# Patient Record
Sex: FEMALE | Race: WHITE | Employment: OTHER | URBAN - METROPOLITAN AREA
[De-identification: names, ages, dates, MRNs, and addresses within clinical notes are randomized per-mention and may not be internally consistent; named-entity substitution may affect disease eponyms.]

---

## 2017-01-07 ENCOUNTER — OFFICE VISIT (OUTPATIENT)
Dept: FAMILY MEDICINE CLINIC | Age: 82
End: 2017-01-07

## 2017-01-07 VITALS
SYSTOLIC BLOOD PRESSURE: 118 MMHG | HEART RATE: 66 BPM | TEMPERATURE: 98.5 F | RESPIRATION RATE: 16 BRPM | OXYGEN SATURATION: 97 % | DIASTOLIC BLOOD PRESSURE: 66 MMHG | HEIGHT: 64 IN

## 2017-01-07 DIAGNOSIS — I10 ESSENTIAL HYPERTENSION: ICD-10-CM

## 2017-01-07 DIAGNOSIS — Z23 NEED FOR PNEUMOCOCCAL VACCINATION: ICD-10-CM

## 2017-01-07 DIAGNOSIS — J44.1 COPD WITH EXACERBATION (HCC): Primary | ICD-10-CM

## 2017-01-07 DIAGNOSIS — E78.5 HYPERLIPIDEMIA, UNSPECIFIED HYPERLIPIDEMIA TYPE: ICD-10-CM

## 2017-01-07 PROCEDURE — 90732 PPSV23 VACC 2 YRS+ SUBQ/IM: CPT | Performed by: FAMILY MEDICINE

## 2017-01-07 PROCEDURE — 99213 OFFICE O/P EST LOW 20 MIN: CPT | Performed by: FAMILY MEDICINE

## 2017-01-07 PROCEDURE — G0009 ADMIN PNEUMOCOCCAL VACCINE: HCPCS | Performed by: FAMILY MEDICINE

## 2017-01-07 RX ORDER — PREDNISONE 20 MG/1
20 TABLET ORAL DAILY
Qty: 5 TABLET | Refills: 0 | Status: SHIPPED | OUTPATIENT
Start: 2017-01-07 | End: 2017-01-12

## 2017-01-07 RX ORDER — AZITHROMYCIN 250 MG/1
TABLET, FILM COATED ORAL
Qty: 1 PACKET | Refills: 0 | Status: SHIPPED | OUTPATIENT
Start: 2017-01-07 | End: 2017-01-17

## 2017-01-21 DIAGNOSIS — I10 ESSENTIAL HYPERTENSION: ICD-10-CM

## 2017-01-23 RX ORDER — VALSARTAN AND HYDROCHLOROTHIAZIDE 80; 12.5 MG/1; MG/1
TABLET, FILM COATED ORAL
Qty: 90 TABLET | Refills: 3 | Status: SHIPPED | OUTPATIENT
Start: 2017-01-23 | End: 2018-05-19 | Stop reason: SDUPTHER

## 2017-03-27 ENCOUNTER — CARE COORDINATION (OUTPATIENT)
Dept: FAMILY MEDICINE CLINIC | Age: 82
End: 2017-03-27

## 2017-03-28 ENCOUNTER — OFFICE VISIT (OUTPATIENT)
Dept: FAMILY MEDICINE CLINIC | Age: 82
End: 2017-03-28

## 2017-03-28 VITALS
DIASTOLIC BLOOD PRESSURE: 86 MMHG | RESPIRATION RATE: 16 BRPM | HEIGHT: 64 IN | OXYGEN SATURATION: 96 % | TEMPERATURE: 97.5 F | SYSTOLIC BLOOD PRESSURE: 110 MMHG | WEIGHT: 157 LBS | BODY MASS INDEX: 26.8 KG/M2 | HEART RATE: 84 BPM

## 2017-03-28 DIAGNOSIS — I10 ESSENTIAL HYPERTENSION: Primary | ICD-10-CM

## 2017-03-28 DIAGNOSIS — J44.1 COPD WITH EXACERBATION (HCC): ICD-10-CM

## 2017-03-28 DIAGNOSIS — N30.00 ACUTE CYSTITIS WITHOUT HEMATURIA: ICD-10-CM

## 2017-03-28 DIAGNOSIS — E78.5 HYPERLIPIDEMIA, UNSPECIFIED HYPERLIPIDEMIA TYPE: ICD-10-CM

## 2017-03-28 DIAGNOSIS — E07.9 THYROID MASS: ICD-10-CM

## 2017-03-28 DIAGNOSIS — M85.80 OSTEOPENIA: ICD-10-CM

## 2017-03-28 DIAGNOSIS — J45.41 MODERATE PERSISTENT ASTHMA WITH ACUTE EXACERBATION: ICD-10-CM

## 2017-03-28 PROCEDURE — 1036F TOBACCO NON-USER: CPT | Performed by: FAMILY MEDICINE

## 2017-03-28 PROCEDURE — 1123F ACP DISCUSS/DSCN MKR DOCD: CPT | Performed by: FAMILY MEDICINE

## 2017-03-28 PROCEDURE — G8484 FLU IMMUNIZE NO ADMIN: HCPCS | Performed by: FAMILY MEDICINE

## 2017-03-28 PROCEDURE — G8427 DOCREV CUR MEDS BY ELIG CLIN: HCPCS | Performed by: FAMILY MEDICINE

## 2017-03-28 PROCEDURE — 99214 OFFICE O/P EST MOD 30 MIN: CPT | Performed by: FAMILY MEDICINE

## 2017-03-28 PROCEDURE — G8399 PT W/DXA RESULTS DOCUMENT: HCPCS | Performed by: FAMILY MEDICINE

## 2017-03-28 PROCEDURE — 1090F PRES/ABSN URINE INCON ASSESS: CPT | Performed by: FAMILY MEDICINE

## 2017-03-28 PROCEDURE — G8926 SPIRO NO PERF OR DOC: HCPCS | Performed by: FAMILY MEDICINE

## 2017-03-28 PROCEDURE — 3023F SPIROM DOC REV: CPT | Performed by: FAMILY MEDICINE

## 2017-03-28 PROCEDURE — 4040F PNEUMOC VAC/ADMIN/RCVD: CPT | Performed by: FAMILY MEDICINE

## 2017-03-28 PROCEDURE — G8420 CALC BMI NORM PARAMETERS: HCPCS | Performed by: FAMILY MEDICINE

## 2017-03-28 RX ORDER — METHYLPREDNISOLONE 4 MG/1
TABLET ORAL
Refills: 0 | COMMUNITY
Start: 2017-03-24 | End: 2017-05-09 | Stop reason: ALTCHOICE

## 2017-03-28 RX ORDER — LEVOFLOXACIN 500 MG/1
TABLET, FILM COATED ORAL
Refills: 0 | COMMUNITY
Start: 2017-03-24 | End: 2017-05-09 | Stop reason: ALTCHOICE

## 2017-03-28 RX ORDER — LACTULOSE 10 G/15ML
10 SOLUTION ORAL EVERY EVENING
Qty: 500 ML | Refills: 3 | Status: SHIPPED | OUTPATIENT
Start: 2017-03-28

## 2017-04-10 ENCOUNTER — TELEPHONE (OUTPATIENT)
Dept: FAMILY MEDICINE CLINIC | Age: 82
End: 2017-04-10

## 2017-04-11 ENCOUNTER — HOSPITAL ENCOUNTER (OUTPATIENT)
Dept: ULTRASOUND IMAGING | Age: 82
Discharge: HOME | End: 2017-04-11
Payer: MEDICARE

## 2017-04-11 DIAGNOSIS — E07.9 THYROID MASS: ICD-10-CM

## 2017-04-11 PROCEDURE — 76536 US EXAM OF HEAD AND NECK: CPT

## 2017-04-13 DIAGNOSIS — J44.1 COPD WITH EXACERBATION (HCC): Primary | ICD-10-CM

## 2017-04-17 RX ORDER — FLUTICASONE FUROATE AND VILANTEROL 200; 25 UG/1; UG/1
1 POWDER RESPIRATORY (INHALATION) DAILY
Qty: 28 EACH | Refills: 5 | Status: SHIPPED | OUTPATIENT
Start: 2017-04-17 | End: 2017-04-18 | Stop reason: CLARIF

## 2017-04-24 DIAGNOSIS — J44.1 COPD WITH EXACERBATION (HCC): ICD-10-CM

## 2017-04-24 RX ORDER — MONTELUKAST SODIUM 10 MG/1
TABLET ORAL
Qty: 90 TABLET | Refills: 1 | Status: SHIPPED | OUTPATIENT
Start: 2017-04-24 | End: 2017-10-21 | Stop reason: SDUPTHER

## 2017-05-09 ENCOUNTER — OFFICE VISIT (OUTPATIENT)
Dept: FAMILY MEDICINE CLINIC | Age: 82
End: 2017-05-09

## 2017-05-09 VITALS
RESPIRATION RATE: 16 BRPM | OXYGEN SATURATION: 97 % | DIASTOLIC BLOOD PRESSURE: 70 MMHG | HEIGHT: 64 IN | HEART RATE: 74 BPM | TEMPERATURE: 98.1 F | SYSTOLIC BLOOD PRESSURE: 122 MMHG

## 2017-05-09 DIAGNOSIS — J44.1 COPD WITH EXACERBATION (HCC): ICD-10-CM

## 2017-05-09 DIAGNOSIS — I10 ESSENTIAL HYPERTENSION: Primary | ICD-10-CM

## 2017-05-09 DIAGNOSIS — E04.1 THYROID CYST: ICD-10-CM

## 2017-05-09 DIAGNOSIS — E78.5 HYPERLIPIDEMIA, UNSPECIFIED HYPERLIPIDEMIA TYPE: ICD-10-CM

## 2017-05-09 PROCEDURE — 1090F PRES/ABSN URINE INCON ASSESS: CPT | Performed by: FAMILY MEDICINE

## 2017-05-09 PROCEDURE — 3023F SPIROM DOC REV: CPT | Performed by: FAMILY MEDICINE

## 2017-05-09 PROCEDURE — G8399 PT W/DXA RESULTS DOCUMENT: HCPCS | Performed by: FAMILY MEDICINE

## 2017-05-09 PROCEDURE — G8427 DOCREV CUR MEDS BY ELIG CLIN: HCPCS | Performed by: FAMILY MEDICINE

## 2017-05-09 PROCEDURE — 1123F ACP DISCUSS/DSCN MKR DOCD: CPT | Performed by: FAMILY MEDICINE

## 2017-05-09 PROCEDURE — G8420 CALC BMI NORM PARAMETERS: HCPCS | Performed by: FAMILY MEDICINE

## 2017-05-09 PROCEDURE — 99213 OFFICE O/P EST LOW 20 MIN: CPT | Performed by: FAMILY MEDICINE

## 2017-05-09 PROCEDURE — G8926 SPIRO NO PERF OR DOC: HCPCS | Performed by: FAMILY MEDICINE

## 2017-05-09 PROCEDURE — 1036F TOBACCO NON-USER: CPT | Performed by: FAMILY MEDICINE

## 2017-05-09 PROCEDURE — 4040F PNEUMOC VAC/ADMIN/RCVD: CPT | Performed by: FAMILY MEDICINE

## 2017-05-30 ENCOUNTER — TELEPHONE (OUTPATIENT)
Dept: FAMILY MEDICINE CLINIC | Age: 82
End: 2017-05-30

## 2017-05-30 DIAGNOSIS — J44.1 COPD WITH EXACERBATION (HCC): Primary | ICD-10-CM

## 2017-05-30 RX ORDER — AZITHROMYCIN 250 MG/1
TABLET, FILM COATED ORAL
Qty: 1 PACKET | Refills: 0 | Status: SHIPPED | OUTPATIENT
Start: 2017-05-30 | End: 2017-06-09

## 2017-05-31 ENCOUNTER — CARE COORDINATION (OUTPATIENT)
Dept: CARE COORDINATION | Age: 82
End: 2017-05-31

## 2017-05-31 DIAGNOSIS — J45.901 ASTHMA EXACERBATION: ICD-10-CM

## 2017-05-31 RX ORDER — PREDNISONE 10 MG/1
TABLET ORAL
Qty: 28 TABLET | Refills: 0 | Status: SHIPPED | OUTPATIENT
Start: 2017-05-31 | End: 2017-07-12 | Stop reason: ALTCHOICE

## 2017-05-31 ASSESSMENT — ENCOUNTER SYMPTOMS: DYSPNEA ASSOCIATED WITH: EXERTION

## 2017-06-15 ENCOUNTER — CARE COORDINATION (OUTPATIENT)
Dept: CARE COORDINATION | Age: 82
End: 2017-06-15

## 2017-06-15 ASSESSMENT — ENCOUNTER SYMPTOMS: DYSPNEA ASSOCIATED WITH: EXERTION

## 2017-07-07 ENCOUNTER — CARE COORDINATION (OUTPATIENT)
Dept: CARE COORDINATION | Age: 82
End: 2017-07-07

## 2017-07-12 ENCOUNTER — OFFICE VISIT (OUTPATIENT)
Dept: FAMILY MEDICINE CLINIC | Age: 82
End: 2017-07-12

## 2017-07-12 ENCOUNTER — CARE COORDINATOR VISIT (OUTPATIENT)
Dept: CARE COORDINATION | Age: 82
End: 2017-07-12

## 2017-07-12 VITALS
DIASTOLIC BLOOD PRESSURE: 62 MMHG | HEART RATE: 76 BPM | RESPIRATION RATE: 16 BRPM | SYSTOLIC BLOOD PRESSURE: 124 MMHG | TEMPERATURE: 97.9 F | HEIGHT: 64 IN

## 2017-07-12 DIAGNOSIS — L65.9 ALOPECIA: ICD-10-CM

## 2017-07-12 DIAGNOSIS — J44.1 COPD WITH EXACERBATION (HCC): ICD-10-CM

## 2017-07-12 DIAGNOSIS — I10 ESSENTIAL HYPERTENSION: Primary | ICD-10-CM

## 2017-07-12 DIAGNOSIS — E78.5 HYPERLIPIDEMIA, UNSPECIFIED HYPERLIPIDEMIA TYPE: ICD-10-CM

## 2017-07-12 DIAGNOSIS — I10 ESSENTIAL HYPERTENSION: ICD-10-CM

## 2017-07-12 DIAGNOSIS — M53.3 COCCYDYNIA: ICD-10-CM

## 2017-07-12 LAB
ALBUMIN SERPL-MCNC: 4.4 G/DL (ref 3.9–4.9)
ALP BLD-CCNC: 81 U/L (ref 40–130)
ALT SERPL-CCNC: 14 U/L (ref 0–33)
ANION GAP SERPL CALCULATED.3IONS-SCNC: 11 MEQ/L (ref 7–13)
AST SERPL-CCNC: 16 U/L (ref 0–35)
BILIRUB SERPL-MCNC: 0.3 MG/DL (ref 0–1.2)
BUN BLDV-MCNC: 17 MG/DL (ref 8–23)
CALCIUM SERPL-MCNC: 9.6 MG/DL (ref 8.6–10.2)
CHLORIDE BLD-SCNC: 103 MEQ/L (ref 98–107)
CHOLESTEROL, TOTAL: 234 MG/DL (ref 0–199)
CO2: 29 MEQ/L (ref 22–29)
CREAT SERPL-MCNC: 0.6 MG/DL (ref 0.5–0.9)
GFR AFRICAN AMERICAN: >60
GFR NON-AFRICAN AMERICAN: >60
GLOBULIN: 2.2 G/DL (ref 2.3–3.5)
GLUCOSE BLD-MCNC: 93 MG/DL (ref 74–109)
HCT VFR BLD CALC: 39.8 % (ref 37–47)
HDLC SERPL-MCNC: 43 MG/DL (ref 40–59)
HEMOGLOBIN: 13.3 G/DL (ref 12–16)
LDL CHOLESTEROL CALCULATED: 149 MG/DL (ref 0–129)
MCH RBC QN AUTO: 29.7 PG (ref 27–31.3)
MCHC RBC AUTO-ENTMCNC: 33.5 % (ref 33–37)
MCV RBC AUTO: 88.9 FL (ref 82–100)
PDW BLD-RTO: 13.7 % (ref 11.5–14.5)
PLATELET # BLD: 187 K/UL (ref 130–400)
POTASSIUM SERPL-SCNC: 4.3 MEQ/L (ref 3.5–5.1)
RBC # BLD: 4.48 M/UL (ref 4.2–5.4)
SODIUM BLD-SCNC: 143 MEQ/L (ref 132–144)
T4 FREE: 0.75 NG/DL (ref 0.93–1.7)
TOTAL PROTEIN: 6.6 G/DL (ref 6.4–8.1)
TRIGL SERPL-MCNC: 209 MG/DL (ref 0–200)
TSH SERPL DL<=0.05 MIU/L-ACNC: 3.1 UIU/ML (ref 0.27–4.2)
WBC # BLD: 4.7 K/UL (ref 4.8–10.8)

## 2017-07-12 PROCEDURE — 99214 OFFICE O/P EST MOD 30 MIN: CPT | Performed by: FAMILY MEDICINE

## 2017-07-12 PROCEDURE — 1036F TOBACCO NON-USER: CPT | Performed by: FAMILY MEDICINE

## 2017-07-12 PROCEDURE — G8926 SPIRO NO PERF OR DOC: HCPCS | Performed by: FAMILY MEDICINE

## 2017-07-12 PROCEDURE — G8419 CALC BMI OUT NRM PARAM NOF/U: HCPCS | Performed by: FAMILY MEDICINE

## 2017-07-12 PROCEDURE — 1090F PRES/ABSN URINE INCON ASSESS: CPT | Performed by: FAMILY MEDICINE

## 2017-07-12 PROCEDURE — 3023F SPIROM DOC REV: CPT | Performed by: FAMILY MEDICINE

## 2017-07-12 PROCEDURE — 1123F ACP DISCUSS/DSCN MKR DOCD: CPT | Performed by: FAMILY MEDICINE

## 2017-07-12 PROCEDURE — G8427 DOCREV CUR MEDS BY ELIG CLIN: HCPCS | Performed by: FAMILY MEDICINE

## 2017-07-12 PROCEDURE — G8399 PT W/DXA RESULTS DOCUMENT: HCPCS | Performed by: FAMILY MEDICINE

## 2017-07-12 PROCEDURE — 4040F PNEUMOC VAC/ADMIN/RCVD: CPT | Performed by: FAMILY MEDICINE

## 2017-07-12 RX ORDER — LEVALBUTEROL TARTRATE 45 UG/1
1 AEROSOL, METERED ORAL EVERY 4 HOURS PRN
Qty: 3 INHALER | Refills: 3 | Status: SHIPPED | OUTPATIENT
Start: 2017-07-12 | End: 2017-10-11 | Stop reason: ALTCHOICE

## 2017-07-13 RX ORDER — LEVOTHYROXINE SODIUM 0.03 MG/1
25 TABLET ORAL DAILY
Qty: 30 TABLET | Refills: 3 | Status: SHIPPED | OUTPATIENT
Start: 2017-07-13 | End: 2017-10-23 | Stop reason: DRUGHIGH

## 2017-07-27 ENCOUNTER — CARE COORDINATION (OUTPATIENT)
Dept: CARE COORDINATION | Age: 82
End: 2017-07-27

## 2017-08-21 ENCOUNTER — CARE COORDINATION (OUTPATIENT)
Dept: CARE COORDINATION | Age: 82
End: 2017-08-21

## 2017-10-11 ENCOUNTER — OFFICE VISIT (OUTPATIENT)
Dept: FAMILY MEDICINE CLINIC | Age: 82
End: 2017-10-11

## 2017-10-11 VITALS
DIASTOLIC BLOOD PRESSURE: 72 MMHG | HEIGHT: 64 IN | HEART RATE: 60 BPM | SYSTOLIC BLOOD PRESSURE: 116 MMHG | RESPIRATION RATE: 16 BRPM | TEMPERATURE: 98.1 F | OXYGEN SATURATION: 97 %

## 2017-10-11 DIAGNOSIS — I10 ESSENTIAL HYPERTENSION: Primary | ICD-10-CM

## 2017-10-11 DIAGNOSIS — I10 ESSENTIAL HYPERTENSION: ICD-10-CM

## 2017-10-11 DIAGNOSIS — J44.1 COPD WITH EXACERBATION (HCC): ICD-10-CM

## 2017-10-11 DIAGNOSIS — E78.5 HYPERLIPIDEMIA, UNSPECIFIED HYPERLIPIDEMIA TYPE: ICD-10-CM

## 2017-10-11 DIAGNOSIS — J45.41 MODERATE PERSISTENT ASTHMA WITH ACUTE EXACERBATION: ICD-10-CM

## 2017-10-11 LAB
ALBUMIN SERPL-MCNC: 4.3 G/DL (ref 3.9–4.9)
ALP BLD-CCNC: 74 U/L (ref 40–130)
ALT SERPL-CCNC: 15 U/L (ref 0–33)
ANION GAP SERPL CALCULATED.3IONS-SCNC: 16 MEQ/L (ref 7–13)
AST SERPL-CCNC: 17 U/L (ref 0–35)
BILIRUB SERPL-MCNC: 0.3 MG/DL (ref 0–1.2)
BUN BLDV-MCNC: 13 MG/DL (ref 8–23)
CALCIUM SERPL-MCNC: 9.6 MG/DL (ref 8.6–10.2)
CHLORIDE BLD-SCNC: 105 MEQ/L (ref 98–107)
CHOLESTEROL, TOTAL: 235 MG/DL (ref 0–199)
CO2: 27 MEQ/L (ref 22–29)
CREAT SERPL-MCNC: 0.72 MG/DL (ref 0.5–0.9)
GFR AFRICAN AMERICAN: >60
GFR NON-AFRICAN AMERICAN: >60
GLOBULIN: 2.2 G/DL (ref 2.3–3.5)
GLUCOSE BLD-MCNC: 104 MG/DL (ref 74–109)
HDLC SERPL-MCNC: 42 MG/DL (ref 40–59)
LDL CHOLESTEROL CALCULATED: 153 MG/DL (ref 0–129)
POTASSIUM SERPL-SCNC: 4.5 MEQ/L (ref 3.5–5.1)
SODIUM BLD-SCNC: 148 MEQ/L (ref 132–144)
T4 FREE: 0.78 NG/DL (ref 0.93–1.7)
TOTAL PROTEIN: 6.5 G/DL (ref 6.4–8.1)
TRIGL SERPL-MCNC: 199 MG/DL (ref 0–200)
TSH SERPL DL<=0.05 MIU/L-ACNC: 2.13 UIU/ML (ref 0.27–4.2)

## 2017-10-11 PROCEDURE — G8421 BMI NOT CALCULATED: HCPCS | Performed by: FAMILY MEDICINE

## 2017-10-11 PROCEDURE — 99214 OFFICE O/P EST MOD 30 MIN: CPT | Performed by: FAMILY MEDICINE

## 2017-10-11 PROCEDURE — G8484 FLU IMMUNIZE NO ADMIN: HCPCS | Performed by: FAMILY MEDICINE

## 2017-10-11 PROCEDURE — G8926 SPIRO NO PERF OR DOC: HCPCS | Performed by: FAMILY MEDICINE

## 2017-10-11 PROCEDURE — 1123F ACP DISCUSS/DSCN MKR DOCD: CPT | Performed by: FAMILY MEDICINE

## 2017-10-11 PROCEDURE — 4040F PNEUMOC VAC/ADMIN/RCVD: CPT | Performed by: FAMILY MEDICINE

## 2017-10-11 PROCEDURE — 1090F PRES/ABSN URINE INCON ASSESS: CPT | Performed by: FAMILY MEDICINE

## 2017-10-11 PROCEDURE — G8427 DOCREV CUR MEDS BY ELIG CLIN: HCPCS | Performed by: FAMILY MEDICINE

## 2017-10-11 PROCEDURE — G8399 PT W/DXA RESULTS DOCUMENT: HCPCS | Performed by: FAMILY MEDICINE

## 2017-10-11 PROCEDURE — 3023F SPIROM DOC REV: CPT | Performed by: FAMILY MEDICINE

## 2017-10-11 PROCEDURE — 1036F TOBACCO NON-USER: CPT | Performed by: FAMILY MEDICINE

## 2017-10-11 RX ORDER — PREDNISONE 20 MG/1
20 TABLET ORAL DAILY
Qty: 5 TABLET | Refills: 0 | Status: SHIPPED | OUTPATIENT
Start: 2017-10-11 | End: 2017-11-17 | Stop reason: SDUPTHER

## 2017-10-11 RX ORDER — AZITHROMYCIN 250 MG/1
TABLET, FILM COATED ORAL
Qty: 1 PACKET | Refills: 0 | Status: SHIPPED | OUTPATIENT
Start: 2017-10-11 | End: 2017-11-17 | Stop reason: SDUPTHER

## 2017-10-11 NOTE — PROGRESS NOTES
Subjective  Laila LINDA Lowe, 81 y.o. female presents today with:  Chief Complaint   Patient presents with    Hypertension    Hyperlipidemia    COPD    Discuss Medications     wants to discuss d/c'ing asthma meds     reqs prn zpak and prednisone-aware of r/b/a  Also declines mammogram and colon ca screening        Past Medical History:   Diagnosis Date    Asthma     COPD (chronic obstructive pulmonary disease) ([address]     Cystocele     Fibrocystic disease of breast     History of TIA (transient ischemic attack)     Hyperlipidemia     Hypertension     Normal cardiac stress test 2011    Osteopenia     Ventral hernia      Past Surgical History:   Procedure Laterality Date    BREAST SURGERY  1980'S    BIOPSIES X2     COLONOSCOPY  2008? [id number] [id number]    GASTRIC FUNDOPLICATION  34/3718    OUT 10/09/2008    HERNIA REPAIR  2433    UMBILICAL    DEBBIE AND BSO  1976    NO CANCER    UPPER GASTROINTESTINAL ENDOSCOPY  2008    hiatal hernia     Social History     Social History    Marital status:      Spouse name: N/A    Number of children: N/A    Years of education: N/A     Occupational History    Not on file.      Social History Main Topics    Smoking status: Never Smoker    Smokeless tobacco: Never Used    Alcohol use No    Drug use: No    Sexual activity: Not on file     Other Topics Concern    Not on file     Social History Narrative    No narrative on file     Family History   Problem Relation Age of Onset    Diabetes Mother     Heart Disease Mother     High Blood Pressure Mother     Colon Cancer Mother     Cancer Brother      BLADDER     Allergies   Allergen Reactions    Niacin And Related     Percocet [Oxycodone-Acetaminophen]     Tetanus Toxoids      Current Outpatient Prescriptions   Medication Sig Dispense Refill    BIOTIN PO Take by mouth      levothyroxine (SYNTHROID) 25 MCG tablet Take 1 tablet by mouth Daily 30 tablet 3    fluticasone-salmeterol (ADVAIR DISKUS) 250-50 MCG/DOSE AEPB Inhale 1 puff into the lungs every 12 hours 3 Inhaler 1    montelukast (SINGULAIR) 10 MG tablet TAKE 1 TABLET DAILY 90 tablet 1    lactulose (CHRONULAC) 10 GM/15ML solution Take 15 mLs by mouth every evening 500 mL 3    valsartan-hydrochlorothiazide (DIOVAN-HCT) 80-12.5 MG per tablet TAKE 1 TABLET DAILY 90 tablet 3    ASMANEX 60 METERED DOSES 220 MCG/INH inhaler USE 2 INHALATIONS DAILY (DISCARD 45 DAYS AFTER  OPENING) 3 Inhaler 1    ipratropium (ATROVENT) 0.02 % nebulizer solution Take 2.5 mLs by nebulization 4 times daily as needed for Wheezing 360 mL 2    Ascorbic Acid (VITAMIN C) 500 MG tablet Take 500 mg by mouth daily      Omega-3 Fatty Acids (FISH OIL PO) Take  by mouth 2 times daily.  loratadine (CLARITIN) 10 MG tablet Take 10 mg by mouth daily.  Multiple Vitamin (MULTIVITAMIN PO) Take 1 tablet by mouth daily. No current facility-administered medications for this visit. The patient denies any history of      seizures,             heart attack or KNOWN CAD        or stroke. No chest pain, CHRONIC MILD shortness of breath, NO paroxysmal nocturnal dyspnea. No nausea, vomiting, diarrhea, hematochezia or melena. No paresthesias or headaches. No dysuria, frequency or hematuria. Last labs  Orders Only on 07/12/2017   Component Date Value Ref Range Status    Cholesterol, Total 07/12/2017 234* 0 - 199 mg/dL Final    Triglycerides 07/12/2017 209* 0 - 200 mg/dL Final    HDL 07/12/2017 43  40 - 59 mg/dL Final    Comment: ATP III HDL Cholesterol Classification is Desirable. Expected Values:    Males:    >55 = No Risk            35-55 = Moderate Risk            <35 = High Risk    Females:  >65 = No Risk            45-65 = Moderate Risk            <45 = High Risk    NCEP Guidelines:   Third Report May 2001  >59 = negative risk factor for CHD  <40 = major risk factor for CHD      LDL Calculated 07/12/2017 149* 0 - 129 mg/dL Final    WBC 07/12/2017 4.7* inhaler Duplicate Order    levalbuterol (XOPENEX HFA) 45 MCG/ACT inhaler Therapy completed    albuterol (PROVENTIL) (2.5 MG/3ML) 0.083% nebulizer solution Therapy completed     No Follow-up on file. Wants to dc claritin and singulair til spring  re cholesterol -bc  on diet  Angelluciano Jaya is advised to follow up ASAP if condition deteriorates or problems arise and if no information on test results to patient in the next 1 month they are advised to call us.      Celia Serrano MD

## 2017-10-21 DIAGNOSIS — J44.1 COPD WITH EXACERBATION (HCC): ICD-10-CM

## 2017-10-23 ENCOUNTER — TELEPHONE (OUTPATIENT)
Dept: FAMILY MEDICINE CLINIC | Age: 82
End: 2017-10-23

## 2017-10-23 DIAGNOSIS — E03.9 ACQUIRED HYPOTHYROIDISM: Primary | ICD-10-CM

## 2017-10-23 RX ORDER — MONTELUKAST SODIUM 10 MG/1
TABLET ORAL
Qty: 90 TABLET | Refills: 1 | Status: SHIPPED | OUTPATIENT
Start: 2017-10-23 | End: 2018-05-19 | Stop reason: SDUPTHER

## 2017-10-23 RX ORDER — LEVOTHYROXINE SODIUM 0.05 MG/1
50 TABLET ORAL DAILY
Qty: 90 TABLET | Refills: 1 | Status: SHIPPED | OUTPATIENT
Start: 2017-10-23 | End: 2018-05-19 | Stop reason: SDUPTHER

## 2017-10-24 RX ORDER — LEVOTHYROXINE SODIUM 0.03 MG/1
25 TABLET ORAL DAILY
Qty: 30 TABLET | Refills: 5 | Status: SHIPPED | OUTPATIENT
Start: 2017-10-24 | End: 2018-04-11 | Stop reason: ALTCHOICE

## 2017-11-17 RX ORDER — AZITHROMYCIN 250 MG/1
TABLET, FILM COATED ORAL
Qty: 6 TABLET | Refills: 0 | Status: SHIPPED | OUTPATIENT
Start: 2017-11-17 | End: 2018-04-11 | Stop reason: ALTCHOICE

## 2017-11-17 RX ORDER — PREDNISONE 20 MG/1
20 TABLET ORAL DAILY
Qty: 5 TABLET | Refills: 0 | Status: SHIPPED | OUTPATIENT
Start: 2017-11-17 | End: 2018-04-11 | Stop reason: SDUPTHER

## 2017-11-17 NOTE — TELEPHONE ENCOUNTER
Patient stopped into office stating she only has enough Advair for today and will need a RX sent to [facility] as well as one sent to [facility] in [place]. Please contact patient when sent to pharmacies. Patient also states she usually receives a reserve Z-pack and prednisone RX. Please advise on this/send to [facility]. Thank you.

## 2018-04-11 ENCOUNTER — OFFICE VISIT (OUTPATIENT)
Dept: FAMILY MEDICINE CLINIC | Age: 83
End: 2018-04-11
Payer: MEDICARE

## 2018-04-11 VITALS
HEIGHT: 64 IN | HEART RATE: 76 BPM | TEMPERATURE: 97.9 F | RESPIRATION RATE: 16 BRPM | SYSTOLIC BLOOD PRESSURE: 130 MMHG | DIASTOLIC BLOOD PRESSURE: 68 MMHG

## 2018-04-11 DIAGNOSIS — E03.9 ACQUIRED HYPOTHYROIDISM: ICD-10-CM

## 2018-04-11 DIAGNOSIS — I10 ESSENTIAL HYPERTENSION: Primary | ICD-10-CM

## 2018-04-11 DIAGNOSIS — E78.5 HYPERLIPIDEMIA, UNSPECIFIED HYPERLIPIDEMIA TYPE: ICD-10-CM

## 2018-04-11 DIAGNOSIS — J44.1 COPD WITH EXACERBATION (HCC): ICD-10-CM

## 2018-04-11 DIAGNOSIS — I10 ESSENTIAL HYPERTENSION: ICD-10-CM

## 2018-04-11 LAB
ALBUMIN SERPL-MCNC: 4.2 G/DL (ref 3.9–4.9)
ALP BLD-CCNC: 70 U/L (ref 40–130)
ALT SERPL-CCNC: 14 U/L (ref 0–33)
ANION GAP SERPL CALCULATED.3IONS-SCNC: 12 MEQ/L (ref 7–13)
AST SERPL-CCNC: 16 U/L (ref 0–35)
BILIRUB SERPL-MCNC: 0.4 MG/DL (ref 0–1.2)
BUN BLDV-MCNC: 16 MG/DL (ref 8–23)
CALCIUM SERPL-MCNC: 9.2 MG/DL (ref 8.6–10.2)
CHLORIDE BLD-SCNC: 103 MEQ/L (ref 98–107)
CO2: 29 MEQ/L (ref 22–29)
CREAT SERPL-MCNC: 0.59 MG/DL (ref 0.5–0.9)
GFR AFRICAN AMERICAN: >60
GFR NON-AFRICAN AMERICAN: >60
GLOBULIN: 2 G/DL (ref 2.3–3.5)
GLUCOSE BLD-MCNC: 89 MG/DL (ref 74–109)
HCT VFR BLD CALC: 37.9 % (ref 37–47)
HEMOGLOBIN: 12.7 G/DL (ref 12–16)
MCH RBC QN AUTO: 30.5 PG (ref 27–31.3)
MCHC RBC AUTO-ENTMCNC: 33.4 % (ref 33–37)
MCV RBC AUTO: 91.2 FL (ref 82–100)
PDW BLD-RTO: 14 % (ref 11.5–14.5)
PLATELET # BLD: 188 K/UL (ref 130–400)
POTASSIUM SERPL-SCNC: 4.2 MEQ/L (ref 3.5–5.1)
RBC # BLD: 4.16 M/UL (ref 4.2–5.4)
SODIUM BLD-SCNC: 144 MEQ/L (ref 132–144)
T4 FREE: 1.17 NG/DL (ref 0.93–1.7)
TOTAL PROTEIN: 6.2 G/DL (ref 6.4–8.1)
TSH SERPL DL<=0.05 MIU/L-ACNC: 1.43 UIU/ML (ref 0.27–4.2)
WBC # BLD: 4.4 K/UL (ref 4.8–10.8)

## 2018-04-11 PROCEDURE — G8427 DOCREV CUR MEDS BY ELIG CLIN: HCPCS | Performed by: FAMILY MEDICINE

## 2018-04-11 PROCEDURE — 99214 OFFICE O/P EST MOD 30 MIN: CPT | Performed by: FAMILY MEDICINE

## 2018-04-11 PROCEDURE — 1123F ACP DISCUSS/DSCN MKR DOCD: CPT | Performed by: FAMILY MEDICINE

## 2018-04-11 PROCEDURE — 4040F PNEUMOC VAC/ADMIN/RCVD: CPT | Performed by: FAMILY MEDICINE

## 2018-04-11 PROCEDURE — G8926 SPIRO NO PERF OR DOC: HCPCS | Performed by: FAMILY MEDICINE

## 2018-04-11 PROCEDURE — 3023F SPIROM DOC REV: CPT | Performed by: FAMILY MEDICINE

## 2018-04-11 PROCEDURE — G8421 BMI NOT CALCULATED: HCPCS | Performed by: FAMILY MEDICINE

## 2018-04-11 PROCEDURE — G8399 PT W/DXA RESULTS DOCUMENT: HCPCS | Performed by: FAMILY MEDICINE

## 2018-04-11 PROCEDURE — 1090F PRES/ABSN URINE INCON ASSESS: CPT | Performed by: FAMILY MEDICINE

## 2018-04-11 PROCEDURE — 1036F TOBACCO NON-USER: CPT | Performed by: FAMILY MEDICINE

## 2018-04-11 RX ORDER — LEVALBUTEROL TARTRATE 45 MCG
HFA AEROSOL WITH ADAPTER (GRAM) INHALATION
COMMUNITY
Start: 2018-03-22

## 2018-04-11 RX ORDER — PREDNISONE 20 MG/1
20 TABLET ORAL DAILY
Qty: 5 TABLET | Refills: 0 | Status: SHIPPED | OUTPATIENT
Start: 2018-04-11 | End: 2018-04-16

## 2018-04-11 RX ORDER — AZITHROMYCIN 250 MG/1
TABLET, FILM COATED ORAL
Qty: 6 TABLET | Refills: 0 | Status: SHIPPED | OUTPATIENT
Start: 2018-04-11 | End: 2018-10-12 | Stop reason: ALTCHOICE

## 2018-04-11 ASSESSMENT — PATIENT HEALTH QUESTIONNAIRE - PHQ9
2. FEELING DOWN, DEPRESSED OR HOPELESS: 0
SUM OF ALL RESPONSES TO PHQ QUESTIONS 1-9: 0
1. LITTLE INTEREST OR PLEASURE IN DOING THINGS: 0
SUM OF ALL RESPONSES TO PHQ9 QUESTIONS 1 & 2: 0

## 2018-05-19 DIAGNOSIS — J44.1 COPD WITH EXACERBATION (HCC): ICD-10-CM

## 2018-05-19 DIAGNOSIS — I10 ESSENTIAL HYPERTENSION: ICD-10-CM

## 2018-05-19 DIAGNOSIS — E03.9 ACQUIRED HYPOTHYROIDISM: ICD-10-CM

## 2018-05-21 RX ORDER — LEVOTHYROXINE SODIUM 0.05 MG/1
TABLET ORAL
Qty: 90 TABLET | Refills: 1 | Status: SHIPPED | OUTPATIENT
Start: 2018-05-21 | End: 2018-10-25 | Stop reason: SDUPTHER

## 2018-05-21 RX ORDER — VALSARTAN AND HYDROCHLOROTHIAZIDE 80; 12.5 MG/1; MG/1
TABLET, FILM COATED ORAL
Qty: 90 TABLET | Refills: 1 | Status: SHIPPED | OUTPATIENT
Start: 2018-05-21 | End: 2018-10-25 | Stop reason: SDUPTHER

## 2018-05-21 RX ORDER — MONTELUKAST SODIUM 10 MG/1
TABLET ORAL
Qty: 90 TABLET | Refills: 1 | Status: SHIPPED | OUTPATIENT
Start: 2018-05-21

## 2018-06-07 ENCOUNTER — OFFICE VISIT (OUTPATIENT)
Dept: FAMILY MEDICINE CLINIC | Age: 83
End: 2018-06-07
Payer: MEDICARE

## 2018-06-07 ENCOUNTER — TELEPHONE (OUTPATIENT)
Dept: FAMILY MEDICINE CLINIC | Age: 83
End: 2018-06-07

## 2018-06-07 VITALS
HEART RATE: 56 BPM | HEIGHT: 64 IN | WEIGHT: 162 LBS | RESPIRATION RATE: 16 BRPM | TEMPERATURE: 98.3 F | SYSTOLIC BLOOD PRESSURE: 128 MMHG | DIASTOLIC BLOOD PRESSURE: 76 MMHG | OXYGEN SATURATION: 97 % | BODY MASS INDEX: 27.66 KG/M2

## 2018-06-07 DIAGNOSIS — T14.8XXA BITE: ICD-10-CM

## 2018-06-07 DIAGNOSIS — E03.9 HYPOTHYROIDISM, UNSPECIFIED TYPE: ICD-10-CM

## 2018-06-07 DIAGNOSIS — J44.9 CHRONIC OBSTRUCTIVE PULMONARY DISEASE, UNSPECIFIED COPD TYPE (HCC): Primary | ICD-10-CM

## 2018-06-07 PROCEDURE — G8427 DOCREV CUR MEDS BY ELIG CLIN: HCPCS | Performed by: INTERNAL MEDICINE

## 2018-06-07 PROCEDURE — G8926 SPIRO NO PERF OR DOC: HCPCS | Performed by: INTERNAL MEDICINE

## 2018-06-07 PROCEDURE — 1123F ACP DISCUSS/DSCN MKR DOCD: CPT | Performed by: INTERNAL MEDICINE

## 2018-06-07 PROCEDURE — 3288F FALL RISK ASSESSMENT DOCD: CPT | Performed by: INTERNAL MEDICINE

## 2018-06-07 PROCEDURE — G8399 PT W/DXA RESULTS DOCUMENT: HCPCS | Performed by: INTERNAL MEDICINE

## 2018-06-07 PROCEDURE — 1036F TOBACCO NON-USER: CPT | Performed by: INTERNAL MEDICINE

## 2018-06-07 PROCEDURE — G8510 SCR DEP NEG, NO PLAN REQD: HCPCS | Performed by: INTERNAL MEDICINE

## 2018-06-07 PROCEDURE — G8419 CALC BMI OUT NRM PARAM NOF/U: HCPCS | Performed by: INTERNAL MEDICINE

## 2018-06-07 PROCEDURE — 99214 OFFICE O/P EST MOD 30 MIN: CPT | Performed by: INTERNAL MEDICINE

## 2018-06-07 PROCEDURE — 1090F PRES/ABSN URINE INCON ASSESS: CPT | Performed by: INTERNAL MEDICINE

## 2018-06-07 PROCEDURE — 3023F SPIROM DOC REV: CPT | Performed by: INTERNAL MEDICINE

## 2018-06-07 PROCEDURE — 4040F PNEUMOC VAC/ADMIN/RCVD: CPT | Performed by: INTERNAL MEDICINE

## 2018-06-07 RX ORDER — AZITHROMYCIN 250 MG/1
TABLET, FILM COATED ORAL
Qty: 1 PACKET | Refills: 0 | Status: SHIPPED | OUTPATIENT
Start: 2018-06-07 | End: 2018-10-12 | Stop reason: ALTCHOICE

## 2018-06-07 RX ORDER — PREDNISONE 20 MG/1
20 TABLET ORAL DAILY
Qty: 5 TABLET | Refills: 0 | Status: SHIPPED | OUTPATIENT
Start: 2018-06-07 | End: 2018-06-26 | Stop reason: SDUPTHER

## 2018-06-07 ASSESSMENT — ENCOUNTER SYMPTOMS
ABDOMINAL PAIN: 0
SHORTNESS OF BREATH: 0
BACK PAIN: 0
SINUS PRESSURE: 1
EYE PAIN: 0

## 2018-06-22 ENCOUNTER — OFFICE VISIT (OUTPATIENT)
Dept: FAMILY MEDICINE CLINIC | Age: 83
End: 2018-06-22
Payer: MEDICARE

## 2018-06-22 VITALS
TEMPERATURE: 98.4 F | OXYGEN SATURATION: 95 % | HEART RATE: 80 BPM | SYSTOLIC BLOOD PRESSURE: 138 MMHG | RESPIRATION RATE: 18 BRPM | DIASTOLIC BLOOD PRESSURE: 80 MMHG

## 2018-06-22 DIAGNOSIS — J44.9 CHRONIC OBSTRUCTIVE PULMONARY DISEASE, UNSPECIFIED COPD TYPE (HCC): ICD-10-CM

## 2018-06-22 DIAGNOSIS — J40 BRONCHITIS: Primary | ICD-10-CM

## 2018-06-22 DIAGNOSIS — H61.23 BILATERAL IMPACTED CERUMEN: ICD-10-CM

## 2018-06-22 DIAGNOSIS — I10 ESSENTIAL HYPERTENSION: ICD-10-CM

## 2018-06-22 PROCEDURE — G8427 DOCREV CUR MEDS BY ELIG CLIN: HCPCS | Performed by: INTERNAL MEDICINE

## 2018-06-22 PROCEDURE — 1123F ACP DISCUSS/DSCN MKR DOCD: CPT | Performed by: INTERNAL MEDICINE

## 2018-06-22 PROCEDURE — 3023F SPIROM DOC REV: CPT | Performed by: INTERNAL MEDICINE

## 2018-06-22 PROCEDURE — G8926 SPIRO NO PERF OR DOC: HCPCS | Performed by: INTERNAL MEDICINE

## 2018-06-22 PROCEDURE — 1090F PRES/ABSN URINE INCON ASSESS: CPT | Performed by: INTERNAL MEDICINE

## 2018-06-22 PROCEDURE — 93000 ELECTROCARDIOGRAM COMPLETE: CPT | Performed by: INTERNAL MEDICINE

## 2018-06-22 PROCEDURE — 69209 REMOVE IMPACTED EAR WAX UNI: CPT | Performed by: INTERNAL MEDICINE

## 2018-06-22 PROCEDURE — 99214 OFFICE O/P EST MOD 30 MIN: CPT | Performed by: INTERNAL MEDICINE

## 2018-06-22 PROCEDURE — 4040F PNEUMOC VAC/ADMIN/RCVD: CPT | Performed by: INTERNAL MEDICINE

## 2018-06-22 PROCEDURE — G8419 CALC BMI OUT NRM PARAM NOF/U: HCPCS | Performed by: INTERNAL MEDICINE

## 2018-06-22 PROCEDURE — G8399 PT W/DXA RESULTS DOCUMENT: HCPCS | Performed by: INTERNAL MEDICINE

## 2018-06-22 PROCEDURE — 1036F TOBACCO NON-USER: CPT | Performed by: INTERNAL MEDICINE

## 2018-06-22 RX ORDER — BENZONATATE 100 MG/1
100 CAPSULE ORAL 3 TIMES DAILY PRN
Qty: 21 CAPSULE | Refills: 0 | Status: SHIPPED | OUTPATIENT
Start: 2018-06-22 | End: 2018-06-29

## 2018-06-22 ASSESSMENT — ENCOUNTER SYMPTOMS
SHORTNESS OF BREATH: 0
COUGH: 1
BACK PAIN: 0
EYE PAIN: 0
ABDOMINAL PAIN: 0

## 2018-06-26 ENCOUNTER — TELEPHONE (OUTPATIENT)
Dept: FAMILY MEDICINE CLINIC | Age: 83
End: 2018-06-26

## 2018-06-26 DIAGNOSIS — J44.9 CHRONIC OBSTRUCTIVE PULMONARY DISEASE, UNSPECIFIED COPD TYPE (HCC): ICD-10-CM

## 2018-06-26 RX ORDER — PREDNISONE 20 MG/1
TABLET ORAL
Qty: 7 TABLET | Refills: 0 | Status: SHIPPED | OUTPATIENT
Start: 2018-06-26 | End: 2018-10-12 | Stop reason: ALTCHOICE

## 2018-10-12 ENCOUNTER — OFFICE VISIT (OUTPATIENT)
Dept: FAMILY MEDICINE CLINIC | Age: 83
End: 2018-10-12
Payer: MEDICARE

## 2018-10-12 VITALS
WEIGHT: 170 LBS | HEART RATE: 80 BPM | TEMPERATURE: 98.3 F | HEIGHT: 64 IN | DIASTOLIC BLOOD PRESSURE: 74 MMHG | RESPIRATION RATE: 16 BRPM | BODY MASS INDEX: 29.02 KG/M2 | SYSTOLIC BLOOD PRESSURE: 126 MMHG

## 2018-10-12 DIAGNOSIS — E03.9 ACQUIRED HYPOTHYROIDISM: ICD-10-CM

## 2018-10-12 DIAGNOSIS — J44.9 CHRONIC OBSTRUCTIVE PULMONARY DISEASE, UNSPECIFIED COPD TYPE (HCC): ICD-10-CM

## 2018-10-12 DIAGNOSIS — E78.5 HYPERLIPIDEMIA, UNSPECIFIED HYPERLIPIDEMIA TYPE: ICD-10-CM

## 2018-10-12 DIAGNOSIS — Z12.31 VISIT FOR SCREENING MAMMOGRAM: ICD-10-CM

## 2018-10-12 DIAGNOSIS — Z23 NEED FOR INFLUENZA VACCINATION: ICD-10-CM

## 2018-10-12 DIAGNOSIS — I10 ESSENTIAL HYPERTENSION: Primary | ICD-10-CM

## 2018-10-12 DIAGNOSIS — I10 ESSENTIAL HYPERTENSION: ICD-10-CM

## 2018-10-12 LAB
ALBUMIN SERPL-MCNC: 4.2 G/DL (ref 3.9–4.9)
ALP BLD-CCNC: 73 U/L (ref 40–130)
ALT SERPL-CCNC: 16 U/L (ref 0–33)
ANION GAP SERPL CALCULATED.3IONS-SCNC: 15 MEQ/L (ref 7–13)
AST SERPL-CCNC: 20 U/L (ref 0–35)
BILIRUB SERPL-MCNC: 0.4 MG/DL (ref 0–1.2)
BUN BLDV-MCNC: 18 MG/DL (ref 8–23)
CALCIUM SERPL-MCNC: 9.8 MG/DL (ref 8.6–10.2)
CHLORIDE BLD-SCNC: 99 MEQ/L (ref 98–107)
CHOLESTEROL, TOTAL: 242 MG/DL (ref 0–199)
CO2: 28 MEQ/L (ref 22–29)
CREAT SERPL-MCNC: 0.67 MG/DL (ref 0.5–0.9)
GFR AFRICAN AMERICAN: >60
GFR NON-AFRICAN AMERICAN: >60
GLOBULIN: 2.5 G/DL (ref 2.3–3.5)
GLUCOSE BLD-MCNC: 90 MG/DL (ref 74–109)
HCT VFR BLD CALC: 40.9 % (ref 37–47)
HDLC SERPL-MCNC: 45 MG/DL (ref 40–59)
HEMOGLOBIN: 13.7 G/DL (ref 12–16)
LDL CHOLESTEROL CALCULATED: 161 MG/DL (ref 0–129)
MCH RBC QN AUTO: 30.5 PG (ref 27–31.3)
MCHC RBC AUTO-ENTMCNC: 33.5 % (ref 33–37)
MCV RBC AUTO: 91 FL (ref 82–100)
PDW BLD-RTO: 13.9 % (ref 11.5–14.5)
PLATELET # BLD: 178 K/UL (ref 130–400)
POTASSIUM SERPL-SCNC: 4.3 MEQ/L (ref 3.5–5.1)
RBC # BLD: 4.49 M/UL (ref 4.2–5.4)
SODIUM BLD-SCNC: 142 MEQ/L (ref 132–144)
T4 FREE: 1.11 NG/DL (ref 0.93–1.7)
TOTAL PROTEIN: 6.7 G/DL (ref 6.4–8.1)
TRIGL SERPL-MCNC: 179 MG/DL (ref 0–200)
TSH SERPL DL<=0.05 MIU/L-ACNC: 1.28 UIU/ML (ref 0.27–4.2)
WBC # BLD: 4.6 K/UL (ref 4.8–10.8)

## 2018-10-12 PROCEDURE — G8427 DOCREV CUR MEDS BY ELIG CLIN: HCPCS | Performed by: FAMILY MEDICINE

## 2018-10-12 PROCEDURE — 3023F SPIROM DOC REV: CPT | Performed by: FAMILY MEDICINE

## 2018-10-12 PROCEDURE — 1090F PRES/ABSN URINE INCON ASSESS: CPT | Performed by: FAMILY MEDICINE

## 2018-10-12 PROCEDURE — 1036F TOBACCO NON-USER: CPT | Performed by: FAMILY MEDICINE

## 2018-10-12 PROCEDURE — 90662 IIV NO PRSV INCREASED AG IM: CPT | Performed by: FAMILY MEDICINE

## 2018-10-12 PROCEDURE — G8399 PT W/DXA RESULTS DOCUMENT: HCPCS | Performed by: FAMILY MEDICINE

## 2018-10-12 PROCEDURE — G8419 CALC BMI OUT NRM PARAM NOF/U: HCPCS | Performed by: FAMILY MEDICINE

## 2018-10-12 PROCEDURE — 1123F ACP DISCUSS/DSCN MKR DOCD: CPT | Performed by: FAMILY MEDICINE

## 2018-10-12 PROCEDURE — G8926 SPIRO NO PERF OR DOC: HCPCS | Performed by: FAMILY MEDICINE

## 2018-10-12 PROCEDURE — G8484 FLU IMMUNIZE NO ADMIN: HCPCS | Performed by: FAMILY MEDICINE

## 2018-10-12 PROCEDURE — 1101F PT FALLS ASSESS-DOCD LE1/YR: CPT | Performed by: FAMILY MEDICINE

## 2018-10-12 PROCEDURE — 99214 OFFICE O/P EST MOD 30 MIN: CPT | Performed by: FAMILY MEDICINE

## 2018-10-12 PROCEDURE — G0008 ADMIN INFLUENZA VIRUS VAC: HCPCS | Performed by: FAMILY MEDICINE

## 2018-10-12 PROCEDURE — 4040F PNEUMOC VAC/ADMIN/RCVD: CPT | Performed by: FAMILY MEDICINE

## 2018-10-12 RX ORDER — AZITHROMYCIN 250 MG/1
TABLET, FILM COATED ORAL
Qty: 1 PACKET | Refills: 0 | Status: SHIPPED | OUTPATIENT
Start: 2018-10-12 | End: 2018-10-16

## 2018-10-12 NOTE — PROGRESS NOTES
Therapy completed    azithromycin (ZITHROMAX) 250 MG tablet Therapy completed    predniSONE (DELTASONE) 20 MG tablet Therapy completed     No Follow-up on file. Breathing well  Exercise addressed  declines colon ca screening  [place] is advised to follow up ASAP if condition deteriorates or problems arise and if no information on test results to patient in the next 1 month they are advised to call us.    Declines mammogram  Walks qd    Maria T Avery MD

## 2018-10-18 ENCOUNTER — HOSPITAL ENCOUNTER (OUTPATIENT)
Dept: WOMENS IMAGING | Age: 83
Discharge: HOME | End: 2018-10-20
Payer: MEDICARE

## 2018-10-18 DIAGNOSIS — Z12.31 VISIT FOR SCREENING MAMMOGRAM: ICD-10-CM

## 2018-10-18 PROCEDURE — 77067 SCR MAMMO BI INCL CAD: CPT

## 2018-10-25 ENCOUNTER — OFFICE VISIT (OUTPATIENT)
Dept: FAMILY MEDICINE CLINIC | Age: 83
End: 2018-10-25
Payer: MEDICARE

## 2018-10-25 VITALS
TEMPERATURE: 98.3 F | WEIGHT: 171.1 LBS | SYSTOLIC BLOOD PRESSURE: 118 MMHG | RESPIRATION RATE: 14 BRPM | DIASTOLIC BLOOD PRESSURE: 80 MMHG | OXYGEN SATURATION: 96 % | BODY MASS INDEX: 29.21 KG/M2 | HEART RATE: 89 BPM | HEIGHT: 64 IN

## 2018-10-25 DIAGNOSIS — I10 ESSENTIAL HYPERTENSION: ICD-10-CM

## 2018-10-25 DIAGNOSIS — E03.9 ACQUIRED HYPOTHYROIDISM: ICD-10-CM

## 2018-10-25 DIAGNOSIS — H60.333 ACUTE SWIMMER'S EAR OF BOTH SIDES: Primary | ICD-10-CM

## 2018-10-25 PROCEDURE — 1036F TOBACCO NON-USER: CPT | Performed by: INTERNAL MEDICINE

## 2018-10-25 PROCEDURE — 4130F TOPICAL PREP RX AOE: CPT | Performed by: INTERNAL MEDICINE

## 2018-10-25 PROCEDURE — 1090F PRES/ABSN URINE INCON ASSESS: CPT | Performed by: INTERNAL MEDICINE

## 2018-10-25 PROCEDURE — 1123F ACP DISCUSS/DSCN MKR DOCD: CPT | Performed by: INTERNAL MEDICINE

## 2018-10-25 PROCEDURE — 1101F PT FALLS ASSESS-DOCD LE1/YR: CPT | Performed by: INTERNAL MEDICINE

## 2018-10-25 PROCEDURE — G8427 DOCREV CUR MEDS BY ELIG CLIN: HCPCS | Performed by: INTERNAL MEDICINE

## 2018-10-25 PROCEDURE — G8482 FLU IMMUNIZE ORDER/ADMIN: HCPCS | Performed by: INTERNAL MEDICINE

## 2018-10-25 PROCEDURE — G8419 CALC BMI OUT NRM PARAM NOF/U: HCPCS | Performed by: INTERNAL MEDICINE

## 2018-10-25 PROCEDURE — G8399 PT W/DXA RESULTS DOCUMENT: HCPCS | Performed by: INTERNAL MEDICINE

## 2018-10-25 PROCEDURE — 4040F PNEUMOC VAC/ADMIN/RCVD: CPT | Performed by: INTERNAL MEDICINE

## 2018-10-25 PROCEDURE — 99213 OFFICE O/P EST LOW 20 MIN: CPT | Performed by: INTERNAL MEDICINE

## 2018-10-25 RX ORDER — CIPROFLOXACIN AND DEXAMETHASONE 3; 1 MG/ML; MG/ML
4 SUSPENSION/ DROPS AURICULAR (OTIC) 2 TIMES DAILY
Qty: 1 BOTTLE | Refills: 0 | Status: SHIPPED | OUTPATIENT
Start: 2018-10-25 | End: 2019-03-15 | Stop reason: ALTCHOICE

## 2018-10-25 ASSESSMENT — ENCOUNTER SYMPTOMS
EYE PAIN: 0
ABDOMINAL PAIN: 0
BACK PAIN: 0
SHORTNESS OF BREATH: 0

## 2018-10-25 NOTE — PROGRESS NOTES
person, place, and time. She appears well-developed and well-nourished. HENT:   Head: Normocephalic. Minimal amount of erythema in right and left middle ear canal.   No pain to palpitation of bl external ears or mastoid processes. Eyes: Pupils are equal, round, and reactive to light. Neck: No tracheal deviation present. Cardiovascular: Normal rate, regular rhythm and normal heart sounds. Exam reveals no gallop and no friction rub. No murmur heard. Pulmonary/Chest: No respiratory distress. Abdominal: Soft. Bowel sounds are normal. She exhibits no distension. There is no rebound. Musculoskeletal: She exhibits no edema. Neurological: She is oriented to person, place, and time. Skin: Skin is warm and dry. Assessment:       Diagnosis Orders   1. Acute swimmer's ear of both sides  ciprofloxacin-dexamethasone (CIPRODEX) 0.3-0.1 % otic suspension         Plan:   Return Monday if not significantly improved. Cipro drops  She denies headaches, neck pain, swelling, difficulty swelling, etc.   No orders of the defined types were placed in this encounter. Orders Placed This Encounter   Medications    ciprofloxacin-dexamethasone (CIPRODEX) 0.3-0.1 % otic suspension     Sig: Place 4 drops into both ears 2 times daily     Dispense:  1 Bottle     Refill:  0       No Follow-up on file.       Yaya Okeefe MD

## 2018-10-26 RX ORDER — VALSARTAN AND HYDROCHLOROTHIAZIDE 80; 12.5 MG/1; MG/1
TABLET, FILM COATED ORAL
Qty: 90 TABLET | Refills: 3 | Status: SHIPPED | OUTPATIENT
Start: 2018-10-26

## 2018-10-26 RX ORDER — LEVOTHYROXINE SODIUM 0.05 MG/1
TABLET ORAL
Qty: 90 TABLET | Refills: 3 | Status: SHIPPED | OUTPATIENT
Start: 2018-10-26

## 2018-11-03 ENCOUNTER — OFFICE VISIT (OUTPATIENT)
Dept: FAMILY MEDICINE CLINIC | Age: 83
End: 2018-11-03
Payer: MEDICARE

## 2018-11-03 VITALS
BODY MASS INDEX: 29.37 KG/M2 | HEART RATE: 72 BPM | RESPIRATION RATE: 16 BRPM | TEMPERATURE: 97.1 F | DIASTOLIC BLOOD PRESSURE: 76 MMHG | HEIGHT: 64 IN | SYSTOLIC BLOOD PRESSURE: 118 MMHG

## 2018-11-03 DIAGNOSIS — J44.9 CHRONIC OBSTRUCTIVE PULMONARY DISEASE, UNSPECIFIED COPD TYPE (HCC): ICD-10-CM

## 2018-11-03 DIAGNOSIS — I10 ESSENTIAL HYPERTENSION: Primary | ICD-10-CM

## 2018-11-03 DIAGNOSIS — E78.5 HYPERLIPIDEMIA, UNSPECIFIED HYPERLIPIDEMIA TYPE: ICD-10-CM

## 2018-11-03 DIAGNOSIS — H65.91 RIGHT NON-SUPPURATIVE OTITIS MEDIA: ICD-10-CM

## 2018-11-03 DIAGNOSIS — E03.9 ACQUIRED HYPOTHYROIDISM: ICD-10-CM

## 2018-11-03 PROCEDURE — G8399 PT W/DXA RESULTS DOCUMENT: HCPCS | Performed by: FAMILY MEDICINE

## 2018-11-03 PROCEDURE — 3023F SPIROM DOC REV: CPT | Performed by: FAMILY MEDICINE

## 2018-11-03 PROCEDURE — G8482 FLU IMMUNIZE ORDER/ADMIN: HCPCS | Performed by: FAMILY MEDICINE

## 2018-11-03 PROCEDURE — G8419 CALC BMI OUT NRM PARAM NOF/U: HCPCS | Performed by: FAMILY MEDICINE

## 2018-11-03 PROCEDURE — 1090F PRES/ABSN URINE INCON ASSESS: CPT | Performed by: FAMILY MEDICINE

## 2018-11-03 PROCEDURE — 1123F ACP DISCUSS/DSCN MKR DOCD: CPT | Performed by: FAMILY MEDICINE

## 2018-11-03 PROCEDURE — G8427 DOCREV CUR MEDS BY ELIG CLIN: HCPCS | Performed by: FAMILY MEDICINE

## 2018-11-03 PROCEDURE — 99213 OFFICE O/P EST LOW 20 MIN: CPT | Performed by: FAMILY MEDICINE

## 2018-11-03 PROCEDURE — G8926 SPIRO NO PERF OR DOC: HCPCS | Performed by: FAMILY MEDICINE

## 2018-11-03 PROCEDURE — 4040F PNEUMOC VAC/ADMIN/RCVD: CPT | Performed by: FAMILY MEDICINE

## 2018-11-03 PROCEDURE — 1101F PT FALLS ASSESS-DOCD LE1/YR: CPT | Performed by: FAMILY MEDICINE

## 2018-11-03 PROCEDURE — 1036F TOBACCO NON-USER: CPT | Performed by: FAMILY MEDICINE

## 2018-11-03 RX ORDER — AZITHROMYCIN 250 MG/1
TABLET, FILM COATED ORAL
Qty: 1 PACKET | Refills: 0 | Status: SHIPPED | OUTPATIENT
Start: 2018-11-03 | End: 2018-11-07

## 2018-11-03 RX ORDER — PREDNISONE 20 MG/1
20 TABLET ORAL DAILY
Qty: 5 TABLET | Refills: 0 | Status: SHIPPED | OUTPATIENT
Start: 2018-11-03 | End: 2019-01-02 | Stop reason: SDUPTHER

## 2018-11-03 NOTE — PROGRESS NOTES
ears 2 times daily 1 Bottle 0    ADVAIR DISKUS 250-50 MCG/DOSE AEPB USE 1 INHALATION EVERY 12 HOURS 180 each 1    montelukast (SINGULAIR) 10 MG tablet TAKE 1 TABLET DAILY 90 tablet 1    linaclotide (LINZESS) 145 MCG capsule Take 1 capsule by mouth every morning (before breakfast) 30 capsule 5    XOPENEX HFA 45 MCG/ACT inhaler       BIOTIN PO Take by mouth      lactulose (CHRONULAC) 10 GM/15ML solution Take 15 mLs by mouth every evening 500 mL 3    ASMANEX 60 METERED DOSES 220 MCG/INH inhaler USE 2 INHALATIONS DAILY (DISCARD 45 DAYS AFTER  OPENING) 3 Inhaler 1    ipratropium (ATROVENT) 0.02 % nebulizer solution Take 2.5 mLs by nebulization 4 times daily as needed for Wheezing 360 mL 2    Ascorbic Acid (VITAMIN C) 500 MG tablet Take 500 mg by mouth daily      Omega-3 Fatty Acids (FISH OIL PO) Take  by mouth 2 times daily.  loratadine (CLARITIN) 10 MG tablet Take 10 mg by mouth daily.  Multiple Vitamin (MULTIVITAMIN PO) Take 1 tablet by mouth daily. No current facility-administered medications for this visit. The patient denies any history of      seizures,             heart attack or KNOWN CAD        or stroke. No chest pain, shortness of breath, paroxysmal nocturnal dyspnea. No nausea, vomiting, diarrhea, hematochezia or melena. No paresthesias or headaches. No dysuria, frequency or hematuria. Last labs  Orders Only on 10/12/2018   Component Date Value Ref Range Status    Cholesterol, Total 10/12/2018 242* 0 - 199 mg/dL Final    ATP III Cholesterol Classification is High.  Triglycerides 10/12/2018 179  0 - 200 mg/dL Final    ATP III Triglycerides Classification is Borderline High.  HDL 10/12/2018 45  40 - 59 mg/dL Final    Comment: ATP III HDL Cholesterol Classification is Desirable.   Expected Values:    Males:    >55 = No Risk            35-55 = Moderate Risk            <35 = High Risk    Females:  >65 = No Risk            45-65 = Moderate Risk

## 2018-11-25 ENCOUNTER — CARE COORDINATION (OUTPATIENT)
Dept: CARE COORDINATION | Age: 83
End: 2018-11-25

## 2018-12-31 ENCOUNTER — OFFICE VISIT (OUTPATIENT)
Dept: FAMILY MEDICINE CLINIC | Age: 83
End: 2018-12-31
Payer: MEDICARE

## 2018-12-31 VITALS
OXYGEN SATURATION: 98 % | HEIGHT: 64 IN | RESPIRATION RATE: 16 BRPM | BODY MASS INDEX: 29.37 KG/M2 | DIASTOLIC BLOOD PRESSURE: 68 MMHG | TEMPERATURE: 97.4 F | SYSTOLIC BLOOD PRESSURE: 128 MMHG | HEART RATE: 58 BPM

## 2018-12-31 DIAGNOSIS — J44.9 CHRONIC OBSTRUCTIVE PULMONARY DISEASE, UNSPECIFIED COPD TYPE (HCC): ICD-10-CM

## 2018-12-31 DIAGNOSIS — I49.9 CARDIAC ARRHYTHMIA, UNSPECIFIED CARDIAC ARRHYTHMIA TYPE: ICD-10-CM

## 2018-12-31 DIAGNOSIS — E03.9 ACQUIRED HYPOTHYROIDISM: ICD-10-CM

## 2018-12-31 DIAGNOSIS — J06.9 VIRAL URI: ICD-10-CM

## 2018-12-31 DIAGNOSIS — H65.113 ACUTE ALLERGIC MUCOID OTITIS MEDIA, BILATERAL: ICD-10-CM

## 2018-12-31 DIAGNOSIS — E78.5 HYPERLIPIDEMIA, UNSPECIFIED HYPERLIPIDEMIA TYPE: ICD-10-CM

## 2018-12-31 DIAGNOSIS — I10 ESSENTIAL HYPERTENSION: Primary | ICD-10-CM

## 2018-12-31 PROCEDURE — 3023F SPIROM DOC REV: CPT | Performed by: FAMILY MEDICINE

## 2018-12-31 PROCEDURE — 1090F PRES/ABSN URINE INCON ASSESS: CPT | Performed by: FAMILY MEDICINE

## 2018-12-31 PROCEDURE — G8482 FLU IMMUNIZE ORDER/ADMIN: HCPCS | Performed by: FAMILY MEDICINE

## 2018-12-31 PROCEDURE — 4040F PNEUMOC VAC/ADMIN/RCVD: CPT | Performed by: FAMILY MEDICINE

## 2018-12-31 PROCEDURE — G8399 PT W/DXA RESULTS DOCUMENT: HCPCS | Performed by: FAMILY MEDICINE

## 2018-12-31 PROCEDURE — 1036F TOBACCO NON-USER: CPT | Performed by: FAMILY MEDICINE

## 2018-12-31 PROCEDURE — G8926 SPIRO NO PERF OR DOC: HCPCS | Performed by: FAMILY MEDICINE

## 2018-12-31 PROCEDURE — 1123F ACP DISCUSS/DSCN MKR DOCD: CPT | Performed by: FAMILY MEDICINE

## 2018-12-31 PROCEDURE — 93000 ELECTROCARDIOGRAM COMPLETE: CPT | Performed by: FAMILY MEDICINE

## 2018-12-31 PROCEDURE — 1101F PT FALLS ASSESS-DOCD LE1/YR: CPT | Performed by: FAMILY MEDICINE

## 2018-12-31 PROCEDURE — 99214 OFFICE O/P EST MOD 30 MIN: CPT | Performed by: FAMILY MEDICINE

## 2018-12-31 PROCEDURE — G8427 DOCREV CUR MEDS BY ELIG CLIN: HCPCS | Performed by: FAMILY MEDICINE

## 2018-12-31 PROCEDURE — G8419 CALC BMI OUT NRM PARAM NOF/U: HCPCS | Performed by: FAMILY MEDICINE

## 2018-12-31 RX ORDER — AZITHROMYCIN 250 MG/1
250 TABLET, FILM COATED ORAL SEE ADMIN INSTRUCTIONS
Qty: 6 TABLET | Refills: 0 | Status: SHIPPED | OUTPATIENT
Start: 2018-12-31 | End: 2019-01-05

## 2018-12-31 NOTE — PROGRESS NOTES
Desirable. Expected Values:    Males:    >55 = No Risk            35-55 = Moderate Risk            <35 = High Risk    Females:  >65 = No Risk            45-65 = Moderate Risk            <45 = High Risk    NCEP Guidelines: Third Report May 2001  >59 = negative risk factor for CHD  <40 = major risk factor for CHD      LDL Calculated 10/12/2018 161* 0 - 129 mg/dL Final    ATP III LDL Classification is High.  WBC 10/12/2018 4.6* 4.8 - 10.8 K/uL Final    RBC 10/12/2018 4.49  4.20 - 5.40 M/uL Final    Hemoglobin 10/12/2018 13.7  12.0 - 16.0 g/dL Final    Hematocrit 10/12/2018 40.9  37.0 - 47.0 % Final    MCV 10/12/2018 91.0  82.0 - 100.0 fL Final    MCH 10/12/2018 30.5  27.0 - 31.3 pg Final    MCHC 10/12/2018 33.5  33.0 - 37.0 % Final    RDW 10/12/2018 13.9  11.5 - 14.5 % Final    Platelets 44/85/8094 178  130 - 400 K/uL Final    Sodium 10/12/2018 142  132 - 144 mEq/L Final    Potassium 10/12/2018 4.3  3.5 - 5.1 mEq/L Final    Chloride 10/12/2018 99  98 - 107 mEq/L Final    CO2 10/12/2018 28  22 - 29 mEq/L Final    Anion Gap 10/12/2018 15* 7 - 13 mEq/L Final    Glucose 10/12/2018 90  74 - 109 mg/dL Final    BUN 10/12/2018 18  8 - 23 mg/dL Final    CREATININE 10/12/2018 0.67  0.50 - 0.90 mg/dL Final    GFR Non- 10/12/2018 >60.0  >60 Final    Comment: >60 mL/min/1.73m2 EGFR, calc. for ages 25 and older using the  MDRD formula (not corrected for weight), is valid for stable  renal function.  GFR  10/12/2018 >60.0  >60 Final    Comment: >60 mL/min/1.73m2 EGFR, calc. for ages 25 and older using the  MDRD formula (not corrected for weight), is valid for stable  renal function.       Calcium 10/12/2018 9.8  8.6 - 10.2 mg/dL Final    Total Protein 10/12/2018 6.7  6.4 - 8.1 g/dL Final    Alb 10/12/2018 4.2  3.9 - 4.9 g/dL Final    Total Bilirubin 10/12/2018 0.4  0.0 - 1.2 mg/dL Final    Alkaline Phosphatase 10/12/2018 73  40 - 130 U/L Final    ALT 10/12/2018 16  0 - 33 U/L Final    AST 10/12/2018 20  0 - 35 U/L Final    Globulin 10/12/2018 2.5  2.3 - 3.5 g/dL Final    TSH 10/12/2018 1.280  0.270 - 4.200 uIU/mL Final    T4 Free 10/12/2018 1.11  0.93 - 1.70 ng/dL Final     Health Maintenance   Topic Date Due    Shingles Vaccine (1 of 2 - 2 Dose Series) 09/24/1986    TSH testing  10/12/2019    Potassium monitoring  10/12/2019    Creatinine monitoring  10/12/2019    DEXA (modify frequency per FRAX score)  Addressed    Flu vaccine  Completed    Pneumococcal low/med risk  Completed       No results found for this visit on 12/31/18. Objective      Wt Readings from Last 3 Encounters:   10/25/18 171 lb 1.6 oz (77.6 kg)   10/12/18 170 lb (77.1 kg)   06/07/18 162 lb (73.5 kg)     Temp Readings from Last 3 Encounters:   12/31/18 97.4 °F (36.3 °C) (Tympanic)   11/03/18 97.1 °F (36.2 °C) (Oral)   10/25/18 98.3 °F (36.8 °C) (Tympanic)     BP Readings from Last 3 Encounters:   12/31/18 128/68   11/03/18 118/76   10/25/18 118/80     Pulse Readings from Last 3 Encounters:   12/31/18 58   11/03/18 72   10/25/18 89       PHYSICAL EXAMINATION:        GENERAL:    The patient appears well nourished and well-developed,     Normal affect. Not appearing significantly anxious or depressed. No acute respiratory distress. Alert and oriented times 3. Skin:     No skin rashes. No concerning moles observed. Gait:    Normal gait. No ataxia. HEENT:  Normocephalic, atraumatic. Throat:  Pharynx is clear, no erythema/ edema or exudates   Ears:    TMs  A bit dull -MILDLY red   Eyes:  Extraocular eye motions intact and pain free. Pupils reactive/equal    Sclerae and conjunctivae clear    NECK: No masses or adenopathy palpable. No carotid bruits heard. No asymmetry visible. No thyromegaly. RESPIRATORY:   Clear/ Equal breath sounds /No acute respiratory distress. No wheezes,rales, or rhonchi.   No percussive abnormalities    HEART: Regular rhythm

## 2019-01-02 DIAGNOSIS — J44.9 CHRONIC OBSTRUCTIVE PULMONARY DISEASE, UNSPECIFIED COPD TYPE (HCC): Primary | ICD-10-CM

## 2019-01-02 RX ORDER — PREDNISONE 20 MG/1
20 TABLET ORAL DAILY
Qty: 5 TABLET | Refills: 0 | Status: SHIPPED | OUTPATIENT
Start: 2019-01-02 | End: 2019-03-15 | Stop reason: SDUPTHER

## 2019-02-12 ENCOUNTER — TELEPHONE (OUTPATIENT)
Dept: FAMILY MEDICINE CLINIC | Age: 84
End: 2019-02-12

## 2019-03-15 ENCOUNTER — OFFICE VISIT (OUTPATIENT)
Dept: FAMILY MEDICINE CLINIC | Age: 84
End: 2019-03-15
Payer: MEDICARE

## 2019-03-15 ENCOUNTER — TELEPHONE (OUTPATIENT)
Dept: ADMINISTRATIVE | Age: 84
End: 2019-03-15

## 2019-03-15 VITALS
WEIGHT: 168 LBS | TEMPERATURE: 98.3 F | DIASTOLIC BLOOD PRESSURE: 62 MMHG | BODY MASS INDEX: 28.68 KG/M2 | RESPIRATION RATE: 18 BRPM | OXYGEN SATURATION: 98 % | SYSTOLIC BLOOD PRESSURE: 110 MMHG | HEART RATE: 76 BPM | HEIGHT: 64 IN

## 2019-03-15 DIAGNOSIS — E03.9 ACQUIRED HYPOTHYROIDISM: ICD-10-CM

## 2019-03-15 DIAGNOSIS — I10 ESSENTIAL HYPERTENSION: Primary | ICD-10-CM

## 2019-03-15 DIAGNOSIS — E78.5 HYPERLIPIDEMIA, UNSPECIFIED HYPERLIPIDEMIA TYPE: ICD-10-CM

## 2019-03-15 DIAGNOSIS — J44.9 CHRONIC OBSTRUCTIVE PULMONARY DISEASE, UNSPECIFIED COPD TYPE (HCC): ICD-10-CM

## 2019-03-15 PROCEDURE — 99214 OFFICE O/P EST MOD 30 MIN: CPT | Performed by: FAMILY MEDICINE

## 2019-03-15 PROCEDURE — G8399 PT W/DXA RESULTS DOCUMENT: HCPCS | Performed by: FAMILY MEDICINE

## 2019-03-15 PROCEDURE — 1123F ACP DISCUSS/DSCN MKR DOCD: CPT | Performed by: FAMILY MEDICINE

## 2019-03-15 PROCEDURE — 1036F TOBACCO NON-USER: CPT | Performed by: FAMILY MEDICINE

## 2019-03-15 PROCEDURE — 4040F PNEUMOC VAC/ADMIN/RCVD: CPT | Performed by: FAMILY MEDICINE

## 2019-03-15 PROCEDURE — G8427 DOCREV CUR MEDS BY ELIG CLIN: HCPCS | Performed by: FAMILY MEDICINE

## 2019-03-15 PROCEDURE — 3023F SPIROM DOC REV: CPT | Performed by: FAMILY MEDICINE

## 2019-03-15 PROCEDURE — G8926 SPIRO NO PERF OR DOC: HCPCS | Performed by: FAMILY MEDICINE

## 2019-03-15 PROCEDURE — 1090F PRES/ABSN URINE INCON ASSESS: CPT | Performed by: FAMILY MEDICINE

## 2019-03-15 PROCEDURE — G8482 FLU IMMUNIZE ORDER/ADMIN: HCPCS | Performed by: FAMILY MEDICINE

## 2019-03-15 PROCEDURE — 1101F PT FALLS ASSESS-DOCD LE1/YR: CPT | Performed by: FAMILY MEDICINE

## 2019-03-15 PROCEDURE — G8419 CALC BMI OUT NRM PARAM NOF/U: HCPCS | Performed by: FAMILY MEDICINE

## 2019-03-15 RX ORDER — PREDNISONE 20 MG/1
20 TABLET ORAL DAILY
Qty: 5 TABLET | Refills: 0 | Status: SHIPPED | OUTPATIENT
Start: 2019-03-15 | End: 2019-03-20

## 2019-03-15 RX ORDER — AZITHROMYCIN 250 MG/1
250 TABLET, FILM COATED ORAL SEE ADMIN INSTRUCTIONS
Qty: 6 TABLET | Refills: 0 | Status: SHIPPED | OUTPATIENT
Start: 2019-03-15 | End: 2019-03-20

## 2019-03-15 ASSESSMENT — PATIENT HEALTH QUESTIONNAIRE - PHQ9
1. LITTLE INTEREST OR PLEASURE IN DOING THINGS: 0
SUM OF ALL RESPONSES TO PHQ QUESTIONS 1-9: 0
SUM OF ALL RESPONSES TO PHQ9 QUESTIONS 1 & 2: 0
2. FEELING DOWN, DEPRESSED OR HOPELESS: 0
SUM OF ALL RESPONSES TO PHQ QUESTIONS 1-9: 0

## 2019-05-01 DIAGNOSIS — J44.1 COPD WITH EXACERBATION (HCC): ICD-10-CM

## 2019-05-01 RX ORDER — MONTELUKAST SODIUM 10 MG/1
TABLET ORAL
Qty: 90 TABLET | Refills: 1 | OUTPATIENT
Start: 2019-05-01

## 2019-10-07 ENCOUNTER — TELEPHONE (OUTPATIENT)
Dept: ENDOCRINOLOGY | Age: 84
End: 2019-10-07

## 2020-06-09 ENCOUNTER — TELEPHONE (OUTPATIENT)
Dept: FAMILY MEDICINE CLINIC | Age: 85
End: 2020-06-09

## 2023-01-01 ENCOUNTER — LAB (OUTPATIENT)
Dept: LAB | Facility: LAB | Age: 88
End: 2023-01-01
Payer: MEDICARE

## 2023-01-01 ENCOUNTER — TELEPHONE (OUTPATIENT)
Dept: PRIMARY CARE | Facility: CLINIC | Age: 88
End: 2023-01-01
Payer: MEDICARE

## 2023-01-01 ENCOUNTER — ANCILLARY PROCEDURE (OUTPATIENT)
Dept: RADIOLOGY | Facility: CLINIC | Age: 88
End: 2023-01-01
Payer: MEDICARE

## 2023-01-01 ENCOUNTER — OFFICE VISIT (OUTPATIENT)
Dept: PRIMARY CARE | Facility: CLINIC | Age: 88
End: 2023-01-01
Payer: MEDICARE

## 2023-01-01 VITALS
BODY MASS INDEX: 29.26 KG/M2 | TEMPERATURE: 97.4 F | OXYGEN SATURATION: 98 % | HEART RATE: 76 BPM | WEIGHT: 159 LBS | RESPIRATION RATE: 16 BRPM | HEIGHT: 62 IN | DIASTOLIC BLOOD PRESSURE: 72 MMHG | SYSTOLIC BLOOD PRESSURE: 124 MMHG

## 2023-01-01 DIAGNOSIS — I73.9 PERIPHERAL VASCULAR DISEASE OF FOOT (CMS-HCC): ICD-10-CM

## 2023-01-01 DIAGNOSIS — J44.9 CHRONIC OBSTRUCTIVE PULMONARY DISEASE, UNSPECIFIED COPD TYPE (MULTI): ICD-10-CM

## 2023-01-01 DIAGNOSIS — M85.80 OSTEOPENIA, UNSPECIFIED LOCATION: ICD-10-CM

## 2023-01-01 DIAGNOSIS — M25.551 PAIN OF RIGHT HIP: ICD-10-CM

## 2023-01-01 DIAGNOSIS — J45.20 MILD INTERMITTENT ASTHMA WITHOUT COMPLICATION (HHS-HCC): ICD-10-CM

## 2023-01-01 DIAGNOSIS — Z00.00 ENCOUNTER FOR MEDICARE ANNUAL WELLNESS EXAM: ICD-10-CM

## 2023-01-01 DIAGNOSIS — E78.5 HYPERLIPIDEMIA, UNSPECIFIED HYPERLIPIDEMIA TYPE: ICD-10-CM

## 2023-01-01 DIAGNOSIS — R73.03 PREDIABETES: ICD-10-CM

## 2023-01-01 DIAGNOSIS — M79.604 LEG PAIN, RIGHT: ICD-10-CM

## 2023-01-01 DIAGNOSIS — I10 ESSENTIAL HYPERTENSION: ICD-10-CM

## 2023-01-01 DIAGNOSIS — Z00.00 ROUTINE GENERAL MEDICAL EXAMINATION AT HEALTH CARE FACILITY: ICD-10-CM

## 2023-01-01 DIAGNOSIS — Z00.00 ROUTINE GENERAL MEDICAL EXAMINATION AT HEALTH CARE FACILITY: Primary | ICD-10-CM

## 2023-01-01 DIAGNOSIS — Q21.12 PFO (PATENT FORAMEN OVALE) (HHS-HCC): ICD-10-CM

## 2023-01-01 DIAGNOSIS — Z86.73 HISTORY OF TIA (TRANSIENT ISCHEMIC ATTACK): ICD-10-CM

## 2023-01-01 DIAGNOSIS — N39.0 FREQUENT UTI: ICD-10-CM

## 2023-01-01 DIAGNOSIS — E03.9 ACQUIRED HYPOTHYROIDISM: ICD-10-CM

## 2023-01-01 DIAGNOSIS — I10 ESSENTIAL HYPERTENSION: Primary | ICD-10-CM

## 2023-01-01 DIAGNOSIS — J44.9 CHRONIC OBSTRUCTIVE PULMONARY DISEASE, UNSPECIFIED COPD TYPE (MULTI): Primary | ICD-10-CM

## 2023-01-01 DIAGNOSIS — M79.604 LEG PAIN, RIGHT: Primary | ICD-10-CM

## 2023-01-01 LAB
ALANINE AMINOTRANSFERASE (SGPT) (U/L) IN SER/PLAS: 18 U/L (ref 7–45)
ALBUMIN (G/DL) IN SER/PLAS: 4.1 G/DL (ref 3.4–5)
ALKALINE PHOSPHATASE (U/L) IN SER/PLAS: 57 U/L (ref 33–136)
ANION GAP IN SER/PLAS: 10 MMOL/L (ref 10–20)
ASPARTATE AMINOTRANSFERASE (SGOT) (U/L) IN SER/PLAS: 20 U/L (ref 9–39)
BASOPHILS (10*3/UL) IN BLOOD BY AUTOMATED COUNT: 0.03 X10E9/L (ref 0–0.1)
BASOPHILS/100 LEUKOCYTES IN BLOOD BY AUTOMATED COUNT: 0.6 % (ref 0–2)
BILIRUBIN TOTAL (MG/DL) IN SER/PLAS: 0.4 MG/DL (ref 0–1.2)
CALCIUM (MG/DL) IN SER/PLAS: 9.6 MG/DL (ref 8.6–10.3)
CARBON DIOXIDE, TOTAL (MMOL/L) IN SER/PLAS: 33 MMOL/L (ref 21–32)
CHLORIDE (MMOL/L) IN SER/PLAS: 102 MMOL/L (ref 98–107)
CHOLESTEROL (MG/DL) IN SER/PLAS: 249 MG/DL (ref 0–199)
CHOLESTEROL IN HDL (MG/DL) IN SER/PLAS: 40.6 MG/DL
CHOLESTEROL/HDL RATIO: 6.1
CREATININE (MG/DL) IN SER/PLAS: 0.89 MG/DL (ref 0.5–1.05)
EOSINOPHILS (10*3/UL) IN BLOOD BY AUTOMATED COUNT: 0.11 X10E9/L (ref 0–0.4)
EOSINOPHILS/100 LEUKOCYTES IN BLOOD BY AUTOMATED COUNT: 2.2 % (ref 0–6)
ERYTHROCYTE DISTRIBUTION WIDTH (RATIO) BY AUTOMATED COUNT: 13.1 % (ref 11.5–14.5)
ERYTHROCYTE MEAN CORPUSCULAR HEMOGLOBIN CONCENTRATION (G/DL) BY AUTOMATED: 33.2 G/DL (ref 32–36)
ERYTHROCYTE MEAN CORPUSCULAR VOLUME (FL) BY AUTOMATED COUNT: 92 FL (ref 80–100)
ERYTHROCYTES (10*6/UL) IN BLOOD BY AUTOMATED COUNT: 4.23 X10E12/L (ref 4–5.2)
ESTIMATED AVERAGE GLUCOSE FOR HBA1C: 120 MG/DL
GFR FEMALE: 63 ML/MIN/1.73M2
GLUCOSE (MG/DL) IN SER/PLAS: 96 MG/DL (ref 74–99)
HEMATOCRIT (%) IN BLOOD BY AUTOMATED COUNT: 39.1 % (ref 36–46)
HEMOGLOBIN (G/DL) IN BLOOD: 13 G/DL (ref 12–16)
HEMOGLOBIN A1C/HEMOGLOBIN TOTAL IN BLOOD: 5.8 %
IMMATURE GRANULOCYTES/100 LEUKOCYTES IN BLOOD BY AUTOMATED COUNT: 0.6 % (ref 0–0.9)
LDL: 152 MG/DL (ref 0–99)
LEUKOCYTES (10*3/UL) IN BLOOD BY AUTOMATED COUNT: 5.1 X10E9/L (ref 4.4–11.3)
LYMPHOCYTES (10*3/UL) IN BLOOD BY AUTOMATED COUNT: 1.97 X10E9/L (ref 0.8–3)
LYMPHOCYTES/100 LEUKOCYTES IN BLOOD BY AUTOMATED COUNT: 38.6 % (ref 13–44)
MONOCYTES (10*3/UL) IN BLOOD BY AUTOMATED COUNT: 0.46 X10E9/L (ref 0.05–0.8)
MONOCYTES/100 LEUKOCYTES IN BLOOD BY AUTOMATED COUNT: 9 % (ref 2–10)
NEUTROPHILS (10*3/UL) IN BLOOD BY AUTOMATED COUNT: 2.5 X10E9/L (ref 1.6–5.5)
NEUTROPHILS/100 LEUKOCYTES IN BLOOD BY AUTOMATED COUNT: 49 % (ref 40–80)
NON HDL CHOLESTEROL: 208 MG/DL
PLATELETS (10*3/UL) IN BLOOD AUTOMATED COUNT: 196 X10E9/L (ref 150–450)
POC APPEARANCE, URINE: ABNORMAL
POC BILIRUBIN, URINE: NEGATIVE
POC BLOOD, URINE: NEGATIVE
POC COLOR, URINE: YELLOW
POC GLUCOSE, URINE: NEGATIVE MG/DL
POC KETONES, URINE: NEGATIVE MG/DL
POC LEUKOCYTES, URINE: ABNORMAL
POC NITRITE,URINE: POSITIVE
POC PH, URINE: 6.5 PH
POC PROTEIN, URINE: NEGATIVE MG/DL
POC SPECIFIC GRAVITY, URINE: 1.02
POC UROBILINOGEN, URINE: 0.2 EU/DL
POTASSIUM (MMOL/L) IN SER/PLAS: 3.8 MMOL/L (ref 3.5–5.3)
PROTEIN TOTAL: 7.1 G/DL (ref 6.4–8.2)
SODIUM (MMOL/L) IN SER/PLAS: 141 MMOL/L (ref 136–145)
T4 FREE SERPL-MCNC: 2.31 NG/DL (ref 0.61–1.12)
THYROTROPIN (MIU/L) IN SER/PLAS BY DETECTION LIMIT <= 0.05 MIU/L: 3.55 MIU/L (ref 0.44–3.98)
THYROXINE (T4) FREE (NG/DL) IN SER/PLAS: 2.5 NG/DL (ref 0.61–1.12)
TRIGLYCERIDE (MG/DL) IN SER/PLAS: 284 MG/DL (ref 0–149)
TSH SERPL-ACNC: 2.18 MIU/L (ref 0.44–3.98)
UREA NITROGEN (MG/DL) IN SER/PLAS: 21 MG/DL (ref 6–23)
URINE CULTURE: ABNORMAL
VLDL: 57 MG/DL (ref 0–40)

## 2023-01-01 PROCEDURE — 73562 X-RAY EXAM OF KNEE 3: CPT | Mod: RIGHT SIDE | Performed by: RADIOLOGY

## 2023-01-01 PROCEDURE — 87077 CULTURE AEROBIC IDENTIFY: CPT

## 2023-01-01 PROCEDURE — 1159F MED LIST DOCD IN RCRD: CPT | Performed by: FAMILY MEDICINE

## 2023-01-01 PROCEDURE — 3074F SYST BP LT 130 MM HG: CPT | Performed by: FAMILY MEDICINE

## 2023-01-01 PROCEDURE — G0439 PPPS, SUBSEQ VISIT: HCPCS | Performed by: FAMILY MEDICINE

## 2023-01-01 PROCEDURE — 1036F TOBACCO NON-USER: CPT | Performed by: FAMILY MEDICINE

## 2023-01-01 PROCEDURE — 85025 COMPLETE CBC W/AUTO DIFF WBC: CPT

## 2023-01-01 PROCEDURE — 84439 ASSAY OF FREE THYROXINE: CPT

## 2023-01-01 PROCEDURE — 73590 X-RAY EXAM OF LOWER LEG: CPT | Mod: RT,FY

## 2023-01-01 PROCEDURE — 36415 COLL VENOUS BLD VENIPUNCTURE: CPT

## 2023-01-01 PROCEDURE — 87086 URINE CULTURE/COLONY COUNT: CPT

## 2023-01-01 PROCEDURE — 3078F DIAST BP <80 MM HG: CPT | Performed by: FAMILY MEDICINE

## 2023-01-01 PROCEDURE — 81003 URINALYSIS AUTO W/O SCOPE: CPT | Performed by: FAMILY MEDICINE

## 2023-01-01 PROCEDURE — 83036 HEMOGLOBIN GLYCOSYLATED A1C: CPT

## 2023-01-01 PROCEDURE — 73552 X-RAY EXAM OF FEMUR 2/>: CPT | Mod: RIGHT SIDE | Performed by: RADIOLOGY

## 2023-01-01 PROCEDURE — 80053 COMPREHEN METABOLIC PANEL: CPT

## 2023-01-01 PROCEDURE — 99213 OFFICE O/P EST LOW 20 MIN: CPT | Performed by: FAMILY MEDICINE

## 2023-01-01 PROCEDURE — 87186 SC STD MICRODIL/AGAR DIL: CPT

## 2023-01-01 PROCEDURE — 73552 X-RAY EXAM OF FEMUR 2/>: CPT | Mod: RT,FY

## 2023-01-01 PROCEDURE — 80061 LIPID PANEL: CPT

## 2023-01-01 PROCEDURE — 84443 ASSAY THYROID STIM HORMONE: CPT

## 2023-01-01 PROCEDURE — 1170F FXNL STATUS ASSESSED: CPT | Performed by: FAMILY MEDICINE

## 2023-01-01 PROCEDURE — 73562 X-RAY EXAM OF KNEE 3: CPT | Mod: RT,FY

## 2023-01-01 PROCEDURE — 73590 X-RAY EXAM OF LOWER LEG: CPT | Mod: RIGHT SIDE | Performed by: RADIOLOGY

## 2023-01-01 RX ORDER — AZITHROMYCIN 250 MG/1
TABLET, FILM COATED ORAL
Qty: 6 TABLET | Refills: 1 | Status: SHIPPED | OUTPATIENT
Start: 2023-01-01 | End: 2023-01-01

## 2023-01-01 RX ORDER — VALSARTAN AND HYDROCHLOROTHIAZIDE 80; 12.5 MG/1; MG/1
1 TABLET, FILM COATED ORAL DAILY
Qty: 30 TABLET | Refills: 0 | Status: SHIPPED | OUTPATIENT
Start: 2023-01-01 | End: 2023-01-01

## 2023-01-01 RX ORDER — NITROFURANTOIN 25; 75 MG/1; MG/1
100 CAPSULE ORAL 2 TIMES DAILY
Qty: 14 CAPSULE | Refills: 0 | Status: SHIPPED | OUTPATIENT
Start: 2023-01-01 | End: 2023-01-01

## 2023-01-01 RX ORDER — VALSARTAN AND HYDROCHLOROTHIAZIDE 80; 12.5 MG/1; MG/1
1 TABLET, FILM COATED ORAL DAILY
Qty: 90 TABLET | Refills: 3 | Status: SHIPPED | OUTPATIENT
Start: 2023-01-01 | End: 2024-06-06

## 2023-01-01 RX ORDER — FLUTICASONE PROPIONATE AND SALMETEROL 250; 50 UG/1; UG/1
1 POWDER RESPIRATORY (INHALATION) EVERY 12 HOURS
Qty: 3 EACH | Refills: 3 | Status: SHIPPED | OUTPATIENT
Start: 2023-01-01 | End: 2024-01-01 | Stop reason: SDUPTHER

## 2023-01-01 ASSESSMENT — ACTIVITIES OF DAILY LIVING (ADL)
GROCERY_SHOPPING: NEEDS ASSISTANCE
TAKING_MEDICATION: NEEDS ASSISTANCE
DOING_HOUSEWORK: NEEDS ASSISTANCE
DRESSING: INDEPENDENT
MANAGING_FINANCES: NEEDS ASSISTANCE
BATHING: INDEPENDENT

## 2023-01-01 ASSESSMENT — PATIENT HEALTH QUESTIONNAIRE - PHQ9
SUM OF ALL RESPONSES TO PHQ9 QUESTIONS 1 AND 2: 0
1. LITTLE INTEREST OR PLEASURE IN DOING THINGS: NOT AT ALL
2. FEELING DOWN, DEPRESSED OR HOPELESS: NOT AT ALL

## 2023-01-01 ASSESSMENT — ENCOUNTER SYMPTOMS
OCCASIONAL FEELINGS OF UNSTEADINESS: 1
LOSS OF SENSATION IN FEET: 0
DEPRESSION: 0

## 2023-04-07 ENCOUNTER — OFFICE VISIT (OUTPATIENT)
Dept: PRIMARY CARE | Facility: CLINIC | Age: 88
End: 2023-04-07
Payer: MEDICARE

## 2023-04-07 VITALS
TEMPERATURE: 98.9 F | OXYGEN SATURATION: 96 % | SYSTOLIC BLOOD PRESSURE: 120 MMHG | HEIGHT: 62 IN | HEART RATE: 96 BPM | WEIGHT: 159.4 LBS | BODY MASS INDEX: 29.33 KG/M2 | RESPIRATION RATE: 16 BRPM | DIASTOLIC BLOOD PRESSURE: 70 MMHG

## 2023-04-07 DIAGNOSIS — H61.21 IMPACTED CERUMEN OF RIGHT EAR: ICD-10-CM

## 2023-04-07 DIAGNOSIS — H65.02 NON-RECURRENT ACUTE SEROUS OTITIS MEDIA OF LEFT EAR: Primary | ICD-10-CM

## 2023-04-07 PROCEDURE — 99213 OFFICE O/P EST LOW 20 MIN: CPT | Performed by: PHYSICIAN ASSISTANT

## 2023-04-07 PROCEDURE — 1036F TOBACCO NON-USER: CPT | Performed by: PHYSICIAN ASSISTANT

## 2023-04-07 PROCEDURE — 1159F MED LIST DOCD IN RCRD: CPT | Performed by: PHYSICIAN ASSISTANT

## 2023-04-07 RX ORDER — FLUTICASONE PROPIONATE 50 MCG
1 SPRAY, SUSPENSION (ML) NASAL
COMMUNITY

## 2023-04-07 RX ORDER — MONTELUKAST SODIUM 10 MG/1
1 TABLET ORAL DAILY
COMMUNITY
Start: 2018-05-21 | End: 2024-01-01

## 2023-04-07 RX ORDER — CETIRIZINE HYDROCHLORIDE 10 MG/1
1 TABLET ORAL DAILY PRN
COMMUNITY
Start: 2021-01-27

## 2023-04-07 RX ORDER — MULTIVIT-MIN/IRON/FOLIC ACID/K 18-600-40
1 CAPSULE ORAL DAILY
COMMUNITY
Start: 2019-08-26

## 2023-04-07 RX ORDER — VALSARTAN AND HYDROCHLOROTHIAZIDE 80; 12.5 MG/1; MG/1
1 TABLET, FILM COATED ORAL DAILY
COMMUNITY
Start: 2018-10-26 | End: 2023-01-01 | Stop reason: SDUPTHER

## 2023-04-07 RX ORDER — LEVALBUTEROL TARTRATE 45 UG/1
1 AEROSOL, METERED ORAL EVERY 4 HOURS PRN
COMMUNITY
Start: 2018-03-22 | End: 2024-01-01 | Stop reason: SDUPTHER

## 2023-04-07 RX ORDER — ASPIRIN 81 MG
1 TABLET, DELAYED RELEASE (ENTERIC COATED) ORAL DAILY
COMMUNITY

## 2023-04-07 RX ORDER — LEVOTHYROXINE SODIUM 50 UG/1
0.5 TABLET ORAL DAILY
COMMUNITY
Start: 2018-10-26

## 2023-04-07 RX ORDER — AMOXICILLIN AND CLAVULANATE POTASSIUM 875; 125 MG/1; MG/1
875 TABLET, FILM COATED ORAL 2 TIMES DAILY
Qty: 20 TABLET | Refills: 0 | Status: SHIPPED | OUTPATIENT
Start: 2023-04-07 | End: 2023-04-17

## 2023-04-07 RX ORDER — SIMETHICONE 40MG/0.6ML
1 SUSPENSION, DROPS(FINAL DOSAGE FORM)(ML) ORAL DAILY
COMMUNITY
Start: 2019-08-26

## 2023-04-07 RX ORDER — FLUTICASONE PROPIONATE AND SALMETEROL 50; 250 UG/1; UG/1
POWDER RESPIRATORY (INHALATION)
COMMUNITY
Start: 2018-11-12 | End: 2023-01-01

## 2023-04-07 RX ORDER — ELECTROLYTES/DEXTROSE
1 SOLUTION, ORAL ORAL DAILY
COMMUNITY
Start: 2019-08-26

## 2023-04-07 NOTE — PROGRESS NOTES
"Subjective   Patient ID: Demi Ayers is a 87 y.o. female who presents for Earache (Dr. Calderon patient is being seen today for ear pain in both ears x today. Patient woke up on 4-4-23 feeling sick and began taking the z-armida, has helped with the cold but ears now hurting. ).    HPI   Earache hasn't improved with the zpack       Review of Systems   HENT:  Positive for ear pain and hearing loss.        Objective   /70 (BP Location: Right arm, Patient Position: Sitting)   Pulse 96   Temp 37.2 °C (98.9 °F)   Resp 16   Ht 1.575 m (5' 2\")   Wt 72.3 kg (159 lb 6.4 oz)   SpO2 96%   BMI 29.15 kg/m²     Physical Exam  HENT:      Right Ear: Decreased hearing noted. There is impacted cerumen.      Left Ear: A middle ear effusion is present. Tympanic membrane is erythematous.       Patient ID: Demi Ayers is a 87 y.o. female.    Ear Cerumen Removal    Date/Time: 4/9/2023 9:34 PM    Performed by: Barbara Barlow PA-C  Authorized by: Barbara Barlow PA-C    Consent:     Consent obtained:  Verbal    Consent given by:  Patient    Risks, benefits, and alternatives were discussed: yes      Risks discussed:  Pain and dizziness  Procedure details:     Location:  R ear    Procedure type: curette      Procedure type comment:  Irrigation with warm water and peroxide was used    Procedure outcomes: cerumen removed    Post-procedure details:     Inspection:  TM intact    Procedure completion:  Tolerated well, no immediate complications        Assessment/Plan   Problem List Items Addressed This Visit    None  Visit Diagnoses       Non-recurrent acute serous otitis media of left ear    -  Primary    Relevant Medications    amoxicillin-pot clavulanate (Augmentin) 875-125 mg tablet        ACUTE OTITIS MEDIA - RIGHT:   - Will rx amoxicillin and recommend symptomatic txs.   - Pt advised to take full antibiotic course and expect rapid improvement.   - Pt advised to f/u if no improvement or worsening sxs after 48 hours on " antibiotic.  - Pt warned of serious side effects of antibiotics including severe diarrhea and allergic reaction and advised to stop the medicine and seek immediate medical attention should these develop.   - All the pt's questions were answered. Pt expressed understanding and agreed to the plan.

## 2023-04-09 PROCEDURE — 69210 REMOVE IMPACTED EAR WAX UNI: CPT | Performed by: PHYSICIAN ASSISTANT

## 2023-04-13 ENCOUNTER — TELEPHONE (OUTPATIENT)
Dept: PRIMARY CARE | Facility: CLINIC | Age: 88
End: 2023-04-13
Payer: MEDICARE

## 2023-04-14 DIAGNOSIS — H61.21 IMPACTED CERUMEN OF RIGHT EAR: ICD-10-CM

## 2023-04-14 DIAGNOSIS — H65.02 NON-RECURRENT ACUTE SEROUS OTITIS MEDIA OF LEFT EAR: ICD-10-CM

## 2023-04-21 ENCOUNTER — OFFICE VISIT (OUTPATIENT)
Dept: PRIMARY CARE | Facility: CLINIC | Age: 88
End: 2023-04-21
Payer: MEDICARE

## 2023-04-21 VITALS
BODY MASS INDEX: 29.33 KG/M2 | RESPIRATION RATE: 16 BRPM | OXYGEN SATURATION: 94 % | SYSTOLIC BLOOD PRESSURE: 122 MMHG | TEMPERATURE: 98.5 F | HEART RATE: 51 BPM | DIASTOLIC BLOOD PRESSURE: 86 MMHG | HEIGHT: 62 IN | WEIGHT: 159.4 LBS

## 2023-04-21 DIAGNOSIS — H61.21 IMPACTED CERUMEN OF RIGHT EAR: ICD-10-CM

## 2023-04-21 DIAGNOSIS — H60.391 OTHER INFECTIVE ACUTE OTITIS EXTERNA OF RIGHT EAR: Primary | ICD-10-CM

## 2023-04-21 PROCEDURE — 99212 OFFICE O/P EST SF 10 MIN: CPT | Performed by: PHYSICIAN ASSISTANT

## 2023-04-21 PROCEDURE — 1036F TOBACCO NON-USER: CPT | Performed by: PHYSICIAN ASSISTANT

## 2023-04-21 PROCEDURE — 1159F MED LIST DOCD IN RCRD: CPT | Performed by: PHYSICIAN ASSISTANT

## 2023-04-21 RX ORDER — CIPROFLOXACIN AND DEXAMETHASONE 3; 1 MG/ML; MG/ML
4 SUSPENSION/ DROPS AURICULAR (OTIC) 2 TIMES DAILY
Qty: 7.5 ML | Refills: 0 | Status: SHIPPED | OUTPATIENT
Start: 2023-04-21 | End: 2023-04-28 | Stop reason: SDUPTHER

## 2023-04-21 NOTE — PROGRESS NOTES
"Subjective   Patient ID: Demi Ayers is a 87 y.o. female who presents for Ear Fullness (Dr. Calderon patient being seen today with c/o bilateral ear blockage x 2 weeks. Patient's daughter states she has to scream in order for her mom to be able to hear her. Patient denies any pain or discomfort from ears. /Patient daughter states she has increased Zyrtec and Singular to BID and uses the Flonase daily and used ear drops but still hasn't helped. ).    HPI   Right ear clogged, can't hear at all out of it   It got a little better recently   Still some wax deep in the right ear covering the surface of the TM may be contributing  No ear pain   Canal a bit swollen  and painful to irrigate - add drops and recheck 1 week     Review of Systems   HENT:  Positive for hearing loss.        Objective   /86 (BP Location: Right arm, Patient Position: Sitting)   Pulse 51   Temp 36.9 °C (98.5 °F)   Resp 16   Ht 1.575 m (5' 2\")   Wt 72.3 kg (159 lb 6.4 oz)   SpO2 94%   BMI 29.15 kg/m²     Physical Exam  Right ear:  Still some wax deep in the right ear covering the surface of the TM   No ear pain with tragal or auricle manipulation   Canal a bit swollen, pain with irrigation     Assessment/Plan   Problem List Items Addressed This Visit    None  RIGHT CERUMEN IMPACTION:  - Difficult to irrigate today due to discomfort from the pt  - Canal a bit swollen so will do a course of Ciprodex x 1 week and try the irrigation again at that point   - Pt plans to schedule with ENT for eval if no improvement with the irrigation/ cerumen disimpaction which I encouraged her to continue with          "

## 2023-04-28 ENCOUNTER — OFFICE VISIT (OUTPATIENT)
Dept: PRIMARY CARE | Facility: CLINIC | Age: 88
End: 2023-04-28
Payer: MEDICARE

## 2023-04-28 VITALS
DIASTOLIC BLOOD PRESSURE: 70 MMHG | HEIGHT: 62 IN | TEMPERATURE: 97.7 F | OXYGEN SATURATION: 97 % | WEIGHT: 158 LBS | HEART RATE: 67 BPM | RESPIRATION RATE: 16 BRPM | SYSTOLIC BLOOD PRESSURE: 112 MMHG | BODY MASS INDEX: 29.08 KG/M2

## 2023-04-28 DIAGNOSIS — H60.391 OTHER INFECTIVE ACUTE OTITIS EXTERNA OF RIGHT EAR: ICD-10-CM

## 2023-04-28 PROCEDURE — 1159F MED LIST DOCD IN RCRD: CPT | Performed by: PHYSICIAN ASSISTANT

## 2023-04-28 PROCEDURE — 99212 OFFICE O/P EST SF 10 MIN: CPT | Performed by: PHYSICIAN ASSISTANT

## 2023-04-28 PROCEDURE — 1036F TOBACCO NON-USER: CPT | Performed by: PHYSICIAN ASSISTANT

## 2023-04-28 RX ORDER — CIPROFLOXACIN AND DEXAMETHASONE 3; 1 MG/ML; MG/ML
4 SUSPENSION/ DROPS AURICULAR (OTIC) 2 TIMES DAILY
Qty: 7.5 ML | Refills: 0 | Status: SHIPPED | OUTPATIENT
Start: 2023-04-28 | End: 2023-01-01

## 2023-04-28 NOTE — PROGRESS NOTES
"Subjective   Patient ID: Demi Ayers is a 87 y.o. female who presents for Ear Fullness (Pt here today for a follow up for right ear; pt has been putting drops in it for a week now but nothing coming in. Feels like its starting in left now too. ).    HPI   Follow up from last visit with me 4/21/23 - had right cerumen impaction and irrigation which was only partially successful. Pt had some pain so was given ciprodex drops and advised to f/u in 1 week.   Hearing better and feeling better but not compeltely better  Using Zyrtec BID and Flonase     Review of Systems   HENT:  Positive for hearing loss. Negative for ear discharge and ear pain.        Objective   /70   Pulse 67   Temp 36.5 °C (97.7 °F)   Resp 16   Ht 1.575 m (5' 2\")   Wt 71.7 kg (158 lb)   SpO2 97%   BMI 28.90 kg/m²     Physical Exam  HENT:      Right Ear: Tympanic membrane and ear canal normal. There is no impacted cerumen.      Left Ear: Tympanic membrane and ear canal normal. There is no impacted cerumen.      Ears:      Comments: Some exudate present right ear canal and covering part of the TM but improvement from last exam        Assessment/Plan   Problem List Items Addressed This Visit    None  Visit Diagnoses       Other infective acute otitis externa of right ear        Relevant Medications    ciprofloxacin-dexamethasone (CiproDEX) otic suspension               "

## 2023-05-22 NOTE — TELEPHONE ENCOUNTER
Patient's daughter phones the office today regarding the patient's script for Valsartan-Hydrochlorothiazide 80-12.5mg 1 TAB every day.     Patient usually gets this script through the mail, however, it is currently in a holding pattern at the post office and has been recommended to contact our office for a short term supply to go to [facility] in [place] until this can  be resolved please.     Thank you.

## 2023-06-29 PROBLEM — I73.9 PERIPHERAL VASCULAR DISEASE OF FOOT (CMS-HCC): Status: ACTIVE | Noted: 2022-04-08

## 2023-06-29 PROBLEM — J45.20 MILD INTERMITTENT ASTHMA (HHS-HCC): Status: ACTIVE | Noted: 2023-01-01

## 2023-06-29 PROBLEM — Q26.8: Status: ACTIVE | Noted: 2023-01-01

## 2023-06-29 PROBLEM — I10 ESSENTIAL HYPERTENSION: Status: ACTIVE | Noted: 2018-10-12

## 2023-06-29 PROBLEM — E03.9 ACQUIRED HYPOTHYROIDISM: Status: ACTIVE | Noted: 2017-10-23

## 2023-06-29 PROBLEM — Q21.12 PFO (PATENT FORAMEN OVALE) (HHS-HCC): Status: ACTIVE | Noted: 2023-01-01

## 2023-06-29 PROBLEM — R73.03 PREDIABETES: Status: ACTIVE | Noted: 2023-01-01

## 2023-07-19 NOTE — PROGRESS NOTES
Subjective   Reason for Visit: Demi Ayers is a 87 y.o. female here for a Medicare Wellness visit.   One fall minor trip declines PT  No injury      CHECKLIST REVIEWED AND COMPLETE FOR [facility]W    Past Medical, Surgical, and Family History reviewed and updated in chart.    Reviewed all medications by prescribing practitioner or clinical pharmacist (such as prescriptions, OTCs, herbal therapies and supplements) and documented in the medical record.  Medicare Annual Wellness Visit Subsequent, Hypertension, Hyperlipidemia, COPD, and Headache (Was getting daily-nasal spray seems to help)  HPI    Patient Self Assessment of Health Status  Patient Self Assessment: Good    Nutrition and Exercise  Current Diet: HEALTHY Diet always best, minimizing excess carbs  Adequate Fluid Intake: Yes  Caffeine: -aware to minimize intake  Exercise Frequency: Regularly advised    Functional Ability/Level of Safety  Cognitive Impairment Observed: No cognitive impairment observed    Home Safety Risk Factors: None    Patient Care Team:  Zenobia Calderon MD as PCP - General    HPI  Patient Active Problem List   Diagnosis    Abnormal inferior vena caval connection    Chronic obstructive pulmonary disease (CMS/HCC)    Essential hypertension    History of TIA (transient ischemic attack)    Hyperlipidemia    Mild intermittent asthma    Osteopenia    Acquired hypothyroidism    Peripheral vascular disease of foot (CMS/HCC)    PFO (patent foramen ovale)    Prediabetes      Past Surgical History:   Procedure Laterality Date    BREAST LUMPECTOMY Left     benign    COLONOSCOPY      unsure of date-pt states she had 2 or 3 done    HIATAL HERNIA REPAIR  2011    HYSTERECTOMY         Review of Systems  This patient has   NO history of recent Covid nor flu symptoms,  NO Fever nor chills,  NO Chest pain, shortness of breath nor paroxysmal nocturnal dyspnea,  NO Nausea, vomiting, nor diarrhea,  NO Hematochezia nor melena,  NO Dysuria, hematuria, nor new  "incontinence issues  NO new severe headaches nor neurological complaints,  NO new issues with anxiety nor depression nor new psychiatric complaints,  NO suicidal nor homicidal ideations.     OBJECTIVE:  /72   Pulse 76   Temp 36.3 °C (97.4 °F) (Temporal)   Resp 16   Ht 1.575 m (5' 2\")   Wt 72.1 kg (159 lb)   SpO2 98%   BMI 29.08 kg/m²      General:  alert, oriented, no acute distress.  No obvious skin rashes noted.   No gait disturbance noted.    Mood is pleasant, not tearful, no signs of emotional distress.  Not appearing intoxicated or altered.   No voiced delusions,   Normal, appropriate behavior.    HEENT: Normocephalic, atraumatic,   Pupils round, reactive to light  Extraocular motions intact and wnl  Tympanic membranes normal    Neck: no nuchal rigidity  No masses palpable.  No carotid bruits.  No thyromegaly.    Respiratory: Equal breath sounds  No wheezes,    rales,    nor rhonchi  No respiratory distress.    Heart: Regular rate and rhythm, no    murmurs  no rubs/gallops 1-2 likely pvcs    Abdomen: no masses palpable, no rebound nor guarding, no rebound nor guarding.    Extremities: NO cyanosis noted, no clubbing.   No edema noted.  2+dorsalis pedis pulses.    Normal-not antalgic, steady gait.    No visits with results within 3 Month(s) from this visit.   Latest known visit with results is:   Legacy Encounter on 01/31/2023   Component Date Value Ref Range Status    WBC 01/31/2023 5.6  4.4 - 11.3 x10E9/L Final    RBC 01/31/2023 4.09  4.00 - 5.20 x10E12/L Final    Hemoglobin 01/31/2023 12.4  12.0 - 16.0 g/dL Final    Hematocrit 01/31/2023 38.5  36.0 - 46.0 % Final    MCV 01/31/2023 94  80 - 100 fL Final    MCHC 01/31/2023 32.2  32.0 - 36.0 g/dL Final    Platelets 01/31/2023 190  150 - 450 x10E9/L Final    RDW 01/31/2023 13.2  11.5 - 14.5 % Final    Glucose 01/31/2023 159 (H)  74 - 99 mg/dL Final    Sodium 01/31/2023 141  136 - 145 mmol/L Final    Potassium 01/31/2023 3.8  3.5 - 5.3 mmol/L Final    " Chloride 01/31/2023 104  98 - 107 mmol/L Final    Bicarbonate 01/31/2023 29  21 - 32 mmol/L Final    Anion Gap 01/31/2023 12  10 - 20 mmol/L Final    Urea Nitrogen 01/31/2023 21  6 - 23 mg/dL Final    Creatinine 01/31/2023 0.85  0.50 - 1.05 mg/dL Final    GFR Female 01/31/2023 67  >90 mL/min/1.73m2 Final    Comment:  CALCULATIONS OF ESTIMATED GFR ARE PERFORMED   USING THE 2021 CKD-EPI STUDY REFIT EQUATION   WITHOUT THE RACE VARIABLE FOR THE IDMS-TRACEABLE   CREATININE METHODS.    https://jasn.asnjournals.org/content/early/2021/09/22/ASN.7854811373      Calcium 01/31/2023 9.2  8.6 - 10.3 mg/dL Final    Albumin 01/31/2023 3.8  3.4 - 5.0 g/dL Final    Alkaline Phosphatase 01/31/2023 54  33 - 136 U/L Final    Total Protein 01/31/2023 6.6  6.4 - 8.2 g/dL Final    AST 01/31/2023 18  9 - 39 U/L Final    Total Bilirubin 01/31/2023 0.3  0.0 - 1.2 mg/dL Final    ALT (SGPT) 01/31/2023 15  7 - 45 U/L Final    Comment:  Patients treated with Sulfasalazine may generate    falsely decreased results for ALT.      Hemoglobin A1C 01/31/2023 5.9 (A)  % Final    Comment:      Diagnosis of Diabetes-Adults   Non-Diabetic: < or = 5.6%   Increased risk for developing diabetes: 5.7-6.4%   Diagnostic of diabetes: > or = 6.5%  .       Monitoring of Diabetes                Age (y)     Therapeutic Goal (%)   Adults:          >18           <7.0   Pediatrics:    13-18           <7.5                   7-12           <8.0                   0- 6            7.5-8.5   American Diabetes Association. Diabetes Care 33(S1), Jan 2010.      Estimated Average Glucose 01/31/2023 123  MG/DL Final    TSH 01/31/2023 1.93  0.44 - 3.98 mIU/L Final    Comment:  TSH testing is performed using different testing    methodology at [facility] than at other    [facility] hospitals. Direct result comparisons should    only be made within the same method.          Assessment/Plan     Problem List Items Addressed This Visit       Chronic obstructive pulmonary  disease (CMS/[facility])    Essential hypertension    History of TIA (transient ischemic attack)    Hyperlipidemia    Mild intermittent asthma    Osteopenia    Acquired hypothyroidism    Peripheral vascular disease of foot (CMS/[facility])    PFO (patent foramen ovale)    Prediabetes     Other Visit Diagnoses       Routine general medical examination at health care facility    -  Primary    Encounter for Medicare annual wellness exam        Frequent UTI        Relevant Orders    Urine Culture    POCT UA Automated manually resulted          Advance Care Planning Note   Discussion Date: 07/19/23   Discussion Participants: patient    The patient wishes to discuss Advance Care Planning today and the following is a brief summary of our discussion.     Patient has capacity to make their own medical decisions: Yes  Health Care Agent/Surrogate Decision Maker documented in chart: Yes  Documents on file and valid:  Advance Directive/Living Will: Yes   Health Care Power of : Yes  Communication of Medical Status/Prognosis:   yes   Communication of Treatment Goals/Options:   yes  Treatment Decisions  yes  Time Statement: Total face to face time spent on advance care planning was <30 minutes with <30 minutes spent in counseling, including the explanation.    SEE ME AT NEXT REGULARLY SCHEDULED VISIT-sooner if condition deteriorates or new problems arise.  Full no code  No depression  No major dementia perhaps dementia  Some cognitive impairment aging    Patient was identified as a fall risk. Risk prevention instructions provided.  Ua abn  NO SXS  But empiric macrobid  The risks, benefits, and alternatives for the antibiotic prescribed were discussed with patient as well as possible side effects. If ANY signs or symptoms of allergic reaction patient is to discontinue medicine immediately and call us or GO TO ER if tongue swelling/respiratory issues or significant effects.  It has already been determined that the benefits outweigh the  risks of an antibiotic for you at this time, unless otherwise indicated.  Antibiotics are usually meant to be taken to completion as advised and it is usual course for symptoms to IMPROVE while taking-if symptoms worsen or new problems arise, we should be notified or medical evaluation should occur. Persistent side effects such as diarrhea especially after antibiotic discontinuation are unusual, and should prompt assessment and evaluation either in office or ER depending on severity. Rash, and/or other symptoms of concern should also cause evaluation.  Complete resolution of original symptoms are expected and we should be informed if this does not occur.  (Only checked urine bc per dtr freq utis no sxs)  Declines cardio workup at this time-offered bc pvcs  Some benign essential tremor hands

## 2023-07-19 NOTE — PATIENT INSTRUCTIONS

## 2023-07-24 NOTE — PATIENT INSTRUCTIONS

## 2023-07-24 NOTE — TELEPHONE ENCOUNTER
Daughter requests a few Z-packs (have on hand) neglected to ask at time of appt.     Soheila Flores

## 2023-10-23 NOTE — TELEPHONE ENCOUNTER
Marquita calling in stating that Marimar hasn't taken her Advair inhaler in over a month due to forgetfullness. Daughter states no change in her breathing and is asking if she needs this any longer or if it's ok to stop the inhaler. Marquita can be reached at [phone]

## 2023-11-30 NOTE — TELEPHONE ENCOUNTER
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

## 2024-01-01 ENCOUNTER — OFFICE VISIT (OUTPATIENT)
Dept: PRIMARY CARE | Facility: CLINIC | Age: 89
End: 2024-01-01
Payer: MEDICARE

## 2024-01-01 ENCOUNTER — TELEPHONE (OUTPATIENT)
Dept: PRIMARY CARE | Facility: CLINIC | Age: 89
End: 2024-01-01
Payer: MEDICARE

## 2024-01-01 ENCOUNTER — LAB (OUTPATIENT)
Dept: LAB | Facility: LAB | Age: 89
End: 2024-01-01
Payer: MEDICARE

## 2024-01-01 VITALS
BODY MASS INDEX: 30.36 KG/M2 | RESPIRATION RATE: 16 BRPM | TEMPERATURE: 97.5 F | HEIGHT: 62 IN | OXYGEN SATURATION: 96 % | SYSTOLIC BLOOD PRESSURE: 126 MMHG | WEIGHT: 165 LBS | HEART RATE: 68 BPM | DIASTOLIC BLOOD PRESSURE: 62 MMHG

## 2024-01-01 VITALS
SYSTOLIC BLOOD PRESSURE: 128 MMHG | RESPIRATION RATE: 18 BRPM | HEART RATE: 84 BPM | WEIGHT: 163 LBS | TEMPERATURE: 97.7 F | OXYGEN SATURATION: 100 % | HEIGHT: 62 IN | BODY MASS INDEX: 30 KG/M2 | DIASTOLIC BLOOD PRESSURE: 84 MMHG

## 2024-01-01 DIAGNOSIS — M54.50 ACUTE LOW BACK PAIN, UNSPECIFIED BACK PAIN LATERALITY, UNSPECIFIED WHETHER SCIATICA PRESENT: ICD-10-CM

## 2024-01-01 DIAGNOSIS — E78.5 HYPERLIPIDEMIA, UNSPECIFIED HYPERLIPIDEMIA TYPE: ICD-10-CM

## 2024-01-01 DIAGNOSIS — J45.20 MILD INTERMITTENT ASTHMA WITHOUT COMPLICATION (HHS-HCC): ICD-10-CM

## 2024-01-01 DIAGNOSIS — M85.80 OSTEOPENIA, UNSPECIFIED LOCATION: ICD-10-CM

## 2024-01-01 DIAGNOSIS — R73.03 PREDIABETES: ICD-10-CM

## 2024-01-01 DIAGNOSIS — R35.0 FREQUENT URINATION: ICD-10-CM

## 2024-01-01 DIAGNOSIS — R39.9 UTI SYMPTOMS: Primary | ICD-10-CM

## 2024-01-01 DIAGNOSIS — J44.9 CHRONIC OBSTRUCTIVE PULMONARY DISEASE, UNSPECIFIED COPD TYPE (MULTI): ICD-10-CM

## 2024-01-01 DIAGNOSIS — N18.31 STAGE 3A CHRONIC KIDNEY DISEASE (MULTI): Primary | ICD-10-CM

## 2024-01-01 DIAGNOSIS — I10 ESSENTIAL HYPERTENSION: ICD-10-CM

## 2024-01-01 DIAGNOSIS — E03.9 ACQUIRED HYPOTHYROIDISM: ICD-10-CM

## 2024-01-01 DIAGNOSIS — I10 ESSENTIAL HYPERTENSION: Primary | ICD-10-CM

## 2024-01-01 DIAGNOSIS — M54.50 ACUTE BILATERAL LOW BACK PAIN WITHOUT SCIATICA: ICD-10-CM

## 2024-01-01 DIAGNOSIS — M19.90 GENERALIZED ARTHRITIS: ICD-10-CM

## 2024-01-01 DIAGNOSIS — Z86.73 HISTORY OF TIA (TRANSIENT ISCHEMIC ATTACK): ICD-10-CM

## 2024-01-01 DIAGNOSIS — I73.9 PERIPHERAL VASCULAR DISEASE OF FOOT (CMS-HCC): ICD-10-CM

## 2024-01-01 LAB
ALBUMIN SERPL BCP-MCNC: 4.3 G/DL (ref 3.4–5)
ALP SERPL-CCNC: 62 U/L (ref 33–136)
ALT SERPL W P-5'-P-CCNC: 15 U/L (ref 7–45)
ANION GAP SERPL CALC-SCNC: 12 MMOL/L (ref 10–20)
AST SERPL W P-5'-P-CCNC: 19 U/L (ref 9–39)
BACTERIA UR CULT: ABNORMAL
BACTERIA UR CULT: NO GROWTH
BASOPHILS # BLD AUTO: 0.03 X10*3/UL (ref 0–0.1)
BASOPHILS NFR BLD AUTO: 0.6 %
BILIRUB SERPL-MCNC: 0.5 MG/DL (ref 0–1.2)
BUN SERPL-MCNC: 26 MG/DL (ref 6–23)
CALCIUM SERPL-MCNC: 9.8 MG/DL (ref 8.6–10.3)
CHLORIDE SERPL-SCNC: 100 MMOL/L (ref 98–107)
CHOLEST SERPL-MCNC: 238 MG/DL (ref 0–199)
CHOLESTEROL/HDL RATIO: 5.5
CO2 SERPL-SCNC: 31 MMOL/L (ref 21–32)
CREAT SERPL-MCNC: 1.44 MG/DL (ref 0.5–1.05)
EGFRCR SERPLBLD CKD-EPI 2021: 35 ML/MIN/1.73M*2
EOSINOPHIL # BLD AUTO: 0.25 X10*3/UL (ref 0–0.4)
EOSINOPHIL NFR BLD AUTO: 4.8 %
ERYTHROCYTE [DISTWIDTH] IN BLOOD BY AUTOMATED COUNT: 13.3 % (ref 11.5–14.5)
EST. AVERAGE GLUCOSE BLD GHB EST-MCNC: 123 MG/DL
GLUCOSE SERPL-MCNC: 110 MG/DL (ref 74–99)
HBA1C MFR BLD: 5.9 %
HCT VFR BLD AUTO: 39.6 % (ref 36–46)
HDLC SERPL-MCNC: 43 MG/DL
HGB BLD-MCNC: 13 G/DL (ref 12–16)
IMM GRANULOCYTES # BLD AUTO: 0.02 X10*3/UL (ref 0–0.5)
IMM GRANULOCYTES NFR BLD AUTO: 0.4 % (ref 0–0.9)
LDLC SERPL CALC-MCNC: 160 MG/DL
LYMPHOCYTES # BLD AUTO: 1.75 X10*3/UL (ref 0.8–3)
LYMPHOCYTES NFR BLD AUTO: 33.7 %
MCH RBC QN AUTO: 30.5 PG (ref 26–34)
MCHC RBC AUTO-ENTMCNC: 32.8 G/DL (ref 32–36)
MCV RBC AUTO: 93 FL (ref 80–100)
MONOCYTES # BLD AUTO: 0.59 X10*3/UL (ref 0.05–0.8)
MONOCYTES NFR BLD AUTO: 11.3 %
NEUTROPHILS # BLD AUTO: 2.56 X10*3/UL (ref 1.6–5.5)
NEUTROPHILS NFR BLD AUTO: 49.2 %
NON HDL CHOLESTEROL: 195 MG/DL (ref 0–149)
NRBC BLD-RTO: 0 /100 WBCS (ref 0–0)
PLATELET # BLD AUTO: 209 X10*3/UL (ref 150–450)
POC APPEARANCE, URINE: ABNORMAL
POC APPEARANCE, URINE: CLEAR
POC BILIRUBIN, URINE: NEGATIVE
POC BILIRUBIN, URINE: NEGATIVE
POC BLOOD, URINE: NEGATIVE
POC BLOOD, URINE: NEGATIVE
POC COLOR, URINE: YELLOW
POC COLOR, URINE: YELLOW
POC GLUCOSE, URINE: NEGATIVE MG/DL
POC GLUCOSE, URINE: NEGATIVE MG/DL
POC KETONES, URINE: NEGATIVE MG/DL
POC KETONES, URINE: NEGATIVE MG/DL
POC LEUKOCYTES, URINE: ABNORMAL
POC LEUKOCYTES, URINE: ABNORMAL
POC NITRITE,URINE: NEGATIVE
POC NITRITE,URINE: POSITIVE
POC PH, URINE: 5.5 PH
POC PH, URINE: 6 PH
POC PROTEIN, URINE: NEGATIVE MG/DL
POC PROTEIN, URINE: NEGATIVE MG/DL
POC SPECIFIC GRAVITY, URINE: 1.01
POC SPECIFIC GRAVITY, URINE: 1.02
POC UROBILINOGEN, URINE: 0.2 EU/DL
POC UROBILINOGEN, URINE: 0.2 EU/DL
POTASSIUM SERPL-SCNC: 4.6 MMOL/L (ref 3.5–5.3)
PROT SERPL-MCNC: 7 G/DL (ref 6.4–8.2)
RBC # BLD AUTO: 4.26 X10*6/UL (ref 4–5.2)
SODIUM SERPL-SCNC: 138 MMOL/L (ref 136–145)
T4 FREE SERPL-MCNC: 0.9 NG/DL (ref 0.61–1.12)
TRIGL SERPL-MCNC: 177 MG/DL (ref 0–149)
TSH SERPL-ACNC: 4.2 MIU/L (ref 0.44–3.98)
VLDL: 35 MG/DL (ref 0–40)
WBC # BLD AUTO: 5.2 X10*3/UL (ref 4.4–11.3)

## 2024-01-01 PROCEDURE — 3079F DIAST BP 80-89 MM HG: CPT | Performed by: PHYSICIAN ASSISTANT

## 2024-01-01 PROCEDURE — 36415 COLL VENOUS BLD VENIPUNCTURE: CPT

## 2024-01-01 PROCEDURE — 1036F TOBACCO NON-USER: CPT | Performed by: FAMILY MEDICINE

## 2024-01-01 PROCEDURE — 81003 URINALYSIS AUTO W/O SCOPE: CPT | Performed by: PHYSICIAN ASSISTANT

## 2024-01-01 PROCEDURE — 84439 ASSAY OF FREE THYROXINE: CPT

## 2024-01-01 PROCEDURE — 85025 COMPLETE CBC W/AUTO DIFF WBC: CPT

## 2024-01-01 PROCEDURE — 99213 OFFICE O/P EST LOW 20 MIN: CPT | Performed by: PHYSICIAN ASSISTANT

## 2024-01-01 PROCEDURE — 80053 COMPREHEN METABOLIC PANEL: CPT

## 2024-01-01 PROCEDURE — 83036 HEMOGLOBIN GLYCOSYLATED A1C: CPT

## 2024-01-01 PROCEDURE — 84443 ASSAY THYROID STIM HORMONE: CPT

## 2024-01-01 PROCEDURE — 1036F TOBACCO NON-USER: CPT | Performed by: PHYSICIAN ASSISTANT

## 2024-01-01 PROCEDURE — 99214 OFFICE O/P EST MOD 30 MIN: CPT | Performed by: FAMILY MEDICINE

## 2024-01-01 PROCEDURE — 1159F MED LIST DOCD IN RCRD: CPT | Performed by: PHYSICIAN ASSISTANT

## 2024-01-01 PROCEDURE — 87086 URINE CULTURE/COLONY COUNT: CPT

## 2024-01-01 PROCEDURE — 3074F SYST BP LT 130 MM HG: CPT | Performed by: FAMILY MEDICINE

## 2024-01-01 PROCEDURE — 80061 LIPID PANEL: CPT

## 2024-01-01 PROCEDURE — 81003 URINALYSIS AUTO W/O SCOPE: CPT | Performed by: FAMILY MEDICINE

## 2024-01-01 PROCEDURE — 1159F MED LIST DOCD IN RCRD: CPT | Performed by: FAMILY MEDICINE

## 2024-01-01 PROCEDURE — 3078F DIAST BP <80 MM HG: CPT | Performed by: FAMILY MEDICINE

## 2024-01-01 PROCEDURE — 1160F RVW MEDS BY RX/DR IN RCRD: CPT | Performed by: FAMILY MEDICINE

## 2024-01-01 PROCEDURE — 3074F SYST BP LT 130 MM HG: CPT | Performed by: PHYSICIAN ASSISTANT

## 2024-01-01 RX ORDER — FLUTICASONE PROPIONATE AND SALMETEROL 250; 50 UG/1; UG/1
1 POWDER RESPIRATORY (INHALATION) EVERY 12 HOURS
Qty: 3 EACH | Refills: 3 | Status: SHIPPED | OUTPATIENT
Start: 2024-01-01 | End: 2024-06-06

## 2024-01-01 RX ORDER — LEVALBUTEROL TARTRATE 45 UG/1
1 AEROSOL, METERED ORAL EVERY 4 HOURS PRN
Qty: 45 G | Refills: 3 | Status: SHIPPED | OUTPATIENT
Start: 2024-01-01 | End: 2024-06-06

## 2024-01-01 RX ORDER — IPRATROPIUM BROMIDE AND ALBUTEROL SULFATE 2.5; .5 MG/3ML; MG/3ML
3 SOLUTION RESPIRATORY (INHALATION) 4 TIMES DAILY PRN
Qty: 150 ML | Refills: 3 | Status: SHIPPED | OUTPATIENT
Start: 2024-01-01 | End: 2024-06-06

## 2024-01-01 RX ORDER — SULFAMETHOXAZOLE AND TRIMETHOPRIM 800; 160 MG/1; MG/1
1 TABLET ORAL 2 TIMES DAILY
Qty: 20 TABLET | Refills: 0 | Status: SHIPPED | OUTPATIENT
Start: 2024-01-01 | End: 2024-01-01

## 2024-01-01 RX ORDER — AZITHROMYCIN 250 MG/1
TABLET, FILM COATED ORAL
Qty: 6 TABLET | Refills: 1 | Status: SHIPPED | OUTPATIENT
Start: 2024-01-01 | End: 2024-01-01

## 2024-01-01 RX ORDER — ALBUTEROL SULFATE 0.83 MG/ML
2.5 SOLUTION RESPIRATORY (INHALATION) 4 TIMES DAILY PRN
Qty: 150 ML | Refills: 3 | Status: SHIPPED | OUTPATIENT
Start: 2024-01-01 | End: 2024-06-06

## 2024-01-01 RX ORDER — MONTELUKAST SODIUM 10 MG/1
10 TABLET ORAL DAILY
Qty: 90 TABLET | Refills: 3 | Status: SHIPPED | OUTPATIENT
Start: 2024-01-01 | End: 2024-06-06

## 2024-01-01 RX ORDER — TRAMADOL HYDROCHLORIDE 50 MG/1
50 TABLET ORAL EVERY 8 HOURS PRN
Qty: 10 TABLET | Refills: 0 | Status: SHIPPED | OUTPATIENT
Start: 2024-01-01 | End: 2024-01-01

## 2024-01-01 RX ORDER — LEVALBUTEROL TARTRATE 45 UG/1
1 AEROSOL, METERED ORAL EVERY 4 HOURS PRN
Qty: 15 G | Refills: 3 | Status: SHIPPED | OUTPATIENT
Start: 2024-01-01 | End: 2024-01-01 | Stop reason: SDUPTHER

## 2024-01-01 RX ORDER — MELOXICAM 7.5 MG/1
7.5 TABLET ORAL DAILY PRN
Qty: 90 TABLET | Refills: 0 | Status: SHIPPED | OUTPATIENT
Start: 2024-01-01 | End: 2024-06-06

## 2024-01-01 RX ORDER — MELOXICAM 7.5 MG/1
7.5 TABLET ORAL DAILY PRN
Qty: 90 TABLET | Refills: 0 | Status: SHIPPED | OUTPATIENT
Start: 2024-01-01 | End: 2024-01-01 | Stop reason: SDUPTHER

## 2024-01-01 RX ORDER — TRAMADOL HYDROCHLORIDE 50 MG/1
50 TABLET ORAL EVERY 8 HOURS PRN
Qty: 30 TABLET | Refills: 0 | Status: SHIPPED | OUTPATIENT
Start: 2024-01-01 | End: 2024-01-01

## 2024-01-01 RX ORDER — ALBUTEROL SULFATE 90 UG/1
2 AEROSOL, METERED RESPIRATORY (INHALATION) EVERY 6 HOURS PRN
Qty: 18 G | Refills: 3 | Status: SHIPPED | OUTPATIENT
Start: 2024-01-01 | End: 2024-06-06

## 2024-01-01 ASSESSMENT — ENCOUNTER SYMPTOMS: BACK PAIN: 1

## 2024-01-16 NOTE — TELEPHONE ENCOUNTER
wanted to let you know Demi is struggling to walk and wanted to know if you would suggest a OTC or send a script to [facility] that may help with the arthritis please advise

## 2024-03-01 NOTE — PROGRESS NOTES
"Subjective   Patient ID: Demi Ayers is a 88 y.o. female who presents for Back Pain (Dr Calderon pt here today for mid back pain since Tuesday; has been using Bendgay and heating pad; has affected her eating, loss of appetite due to the pain. ).    HPI     Mid back pain   Tried deep blue   Takes Tylenol sometimes but doesn't help   Rating severity 10/10 - was crying yesterday   Heating pad   Worse with moving or laughing       Review of Systems   Musculoskeletal:  Positive for back pain.       Objective   /84   Pulse 84   Temp 36.5 °C (97.7 °F)   Resp 18   Ht 1.575 m (5' 2\")   Wt 73.9 kg (163 lb)   SpO2 100%   BMI 29.81 kg/m²     Physical Exam  Constitutional:       Appearance: Normal appearance.   Cardiovascular:      Rate and Rhythm: Normal rate and regular rhythm.      Pulses: Normal pulses.      Heart sounds: Normal heart sounds. No murmur heard.  Pulmonary:      Effort: Pulmonary effort is normal.      Breath sounds: Normal breath sounds.   Musculoskeletal:      Thoracic back: Spasms and tenderness present. No swelling, deformity, signs of trauma or lacerations.   Neurological:      Mental Status: She is alert.   Psychiatric:         Mood and Affect: Mood and affect normal.         Assessment/Plan     Problem List Items Addressed This Visit    None  Visit Diagnoses       UTI symptoms    -  Primary    Relevant Medications    sulfamethoxazole-trimethoprim (Bactrim DS) 800-160 mg tablet    Other Relevant Orders    Urine Culture (Completed)    Acute bilateral low back pain without sciatica        Relevant Medications    traMADol (Ultram) 50 mg tablet    Other Relevant Orders    POCT UA Automated manually resulted (Completed)            "

## 2024-03-19 PROBLEM — N18.31 STAGE 3A CHRONIC KIDNEY DISEASE (MULTI): Status: ACTIVE | Noted: 2024-01-01

## 2024-03-19 NOTE — PROGRESS NOTES
Covid vax: declined  Flu: declined  Tdap: advised  Pneumo: UTD  RSV: advised  Shingles: advised

## 2024-03-19 NOTE — PROGRESS NOTES
"Subjective   Patient ID: Demi Ayers is a 88 y.o. female who presents for Prediabetes, Hypertension, Hyperlipidemia, Hypothyroidism, COPD, Medication Question (Would like to have rx for zpak at home), and Back Pain (Barbara gave her atbc and tramadol a couple of weeks ago).  Covid vax: declined  Flu: declined  Tdap: advised  Pneumo: UTD  RSV: advised  Shingles: advised   no urinary sxs  No recent illnesses   hba1c 5.9  Had zpak last mo-family called in, nebs  Took 2 and finally better  Drinking more water for renal fxn    Declines statin at this time      HPI  Patient Active Problem List   Diagnosis    Abnormal inferior vena caval connection    Chronic obstructive pulmonary disease (CMS/HCC)    Essential hypertension    History of TIA (transient ischemic attack)    Hyperlipidemia    Mild intermittent asthma    Osteopenia    Acquired hypothyroidism    Peripheral vascular disease of foot (CMS/HCC)    PFO (patent foramen ovale)    Prediabetes    Stage 3a chronic kidney disease (CMS/HCC)       Past Surgical History:   Procedure Laterality Date    BREAST LUMPECTOMY Left     benign    COLONOSCOPY      unsure of date-pt states she had 2 or 3 done    HIATAL HERNIA REPAIR  2011    HYSTERECTOMY         Review of Systems  This patient has  NO history of seizures/ CAD or CVA    NO history of recent Covid nor flu symptoms,  NO Fever nor chills,  NO Chest pain, shortness of breath nor paroxysmal nocturnal dyspnea,  NO Nausea, vomiting, nor diarrhea,  NO Hematochezia nor melena,  NO Dysuria, hematuria, nor new incontinence issues  NO new severe headaches nor neurological complaints,  NO new issues with anxiety nor depression nor new psychiatric complaints,  NO suicidal nor homicidal ideations.     OBJECTIVE:  /62   Pulse 68   Temp 36.4 °C (97.5 °F) (Temporal)   Resp 16   Ht 1.575 m (5' 2\")   Wt 74.8 kg (165 lb)   SpO2 96%   BMI 30.18 kg/m²      General:  alert, oriented, no acute distress.  No obvious skin rashes " noted.   No gait disturbance noted.    Mood is pleasant,  no signs of emotional distress.   Not appearing intoxicated or altered.   No voiced delusions,   Normal, appropriate behavior.  Mid forehead healing scab had derm-bx 3mm no cellulitis    HEENT: Normocephalic, atraumatic,   Pupils round, reactive to light  Extraocular motions intact and wnl  Tympanic membranes normal    Neck: no nuchal rigidity  No masses palpable.  No carotid bruits.  No thyromegaly.    Respiratory: Equal breath sounds  No wheezes,    rales,    nor rhonchi  No respiratory distress.    Heart: Regular rate and rhythm, no    murmurs  no rubs/gallops    Abdomen: no masses palpable, nontender, no rebound nor guarding.    Extremities: NO cyanosis noted, no clubbing.   No edema noted.  2+dorsalis pedis pulses.    Normal-not antalgic, steady gait w cane      Office Visit on 03/19/2024   Component Date Value Ref Range Status    POC Color, Urine 03/19/2024 Yellow  Straw, Yellow, Light-Yellow Final    POC Appearance, Urine 03/19/2024 Clear  Clear Final    POC Glucose, Urine 03/19/2024 NEGATIVE  NEGATIVE mg/dl Final    POC Bilirubin, Urine 03/19/2024 NEGATIVE  NEGATIVE Final    POC Ketones, Urine 03/19/2024 NEGATIVE  NEGATIVE mg/dl Final    POC Specific Gravity, Urine 03/19/2024 1.020  1.005 - 1.035 Final    POC Blood, Urine 03/19/2024 NEGATIVE  NEGATIVE Final    POC PH, Urine 03/19/2024 6.0  No Reference Range Established PH Final    POC Protein, Urine 03/19/2024 NEGATIVE  NEGATIVE, 30 (1+) mg/dl Final    POC Urobilinogen, Urine 03/19/2024 0.2  0.2, 1.0 EU/DL Final    Poc Nitrite, Urine 03/19/2024 NEGATIVE  NEGATIVE Final    POC Leukocytes, Urine 03/19/2024 SMALL (1+) (A)  NEGATIVE Final   Lab on 03/12/2024   Component Date Value Ref Range Status    WBC 03/12/2024 5.2  4.4 - 11.3 x10*3/uL Final    nRBC 03/12/2024 0.0  0.0 - 0.0 /100 WBCs Final    RBC 03/12/2024 4.26  4.00 - 5.20 x10*6/uL Final    Hemoglobin 03/12/2024 13.0  12.0 - 16.0 g/dL Final     Hematocrit 03/12/2024 39.6  36.0 - 46.0 % Final    MCV 03/12/2024 93  80 - 100 fL Final    MCH 03/12/2024 30.5  26.0 - 34.0 pg Final    MCHC 03/12/2024 32.8  32.0 - 36.0 g/dL Final    RDW 03/12/2024 13.3  11.5 - 14.5 % Final    Platelets 03/12/2024 209  150 - 450 x10*3/uL Final    Neutrophils % 03/12/2024 49.2  40.0 - 80.0 % Final    Immature Granulocytes %, Automated 03/12/2024 0.4  0.0 - 0.9 % Final    Immature Granulocyte Count (IG) includes promyelocytes, myelocytes and metamyelocytes but does not include bands. Percent differential counts (%) should be interpreted in the context of the absolute cell counts (cells/UL).    Lymphocytes % 03/12/2024 33.7  13.0 - 44.0 % Final    Monocytes % 03/12/2024 11.3  2.0 - 10.0 % Final    Eosinophils % 03/12/2024 4.8  0.0 - 6.0 % Final    Basophils % 03/12/2024 0.6  0.0 - 2.0 % Final    Neutrophils Absolute 03/12/2024 2.56  1.60 - 5.50 x10*3/uL Final    Percent differential counts (%) should be interpreted in the context of the absolute cell counts (cells/uL).    Immature Granulocytes Absolute, Au* 03/12/2024 0.02  0.00 - 0.50 x10*3/uL Final    Lymphocytes Absolute 03/12/2024 1.75  0.80 - 3.00 x10*3/uL Final    Monocytes Absolute 03/12/2024 0.59  0.05 - 0.80 x10*3/uL Final    Eosinophils Absolute 03/12/2024 0.25  0.00 - 0.40 x10*3/uL Final    Basophils Absolute 03/12/2024 0.03  0.00 - 0.10 x10*3/uL Final    Glucose 03/12/2024 110 (H)  74 - 99 mg/dL Final    Sodium 03/12/2024 138  136 - 145 mmol/L Final    Potassium 03/12/2024 4.6  3.5 - 5.3 mmol/L Final    Chloride 03/12/2024 100  98 - 107 mmol/L Final    Bicarbonate 03/12/2024 31  21 - 32 mmol/L Final    Anion Gap 03/12/2024 12  10 - 20 mmol/L Final    Urea Nitrogen 03/12/2024 26 (H)  6 - 23 mg/dL Final    Creatinine 03/12/2024 1.44 (H)  0.50 - 1.05 mg/dL Final    eGFR 03/12/2024 35 (L)  >60 mL/min/1.73m*2 Final    Calculations of estimated GFR are performed using the 2021 CKD-EPI Study Refit equation without the race  variable for the IDMS-Traceable creatinine methods.  https://jasn.asnjournals.org/content/early/2021/09/22/ASN.4026046633    Calcium 03/12/2024 9.8  8.6 - 10.3 mg/dL Final    Albumin 03/12/2024 4.3  3.4 - 5.0 g/dL Final    Alkaline Phosphatase 03/12/2024 62  33 - 136 U/L Final    Total Protein 03/12/2024 7.0  6.4 - 8.2 g/dL Final    AST 03/12/2024 19  9 - 39 U/L Final    Bilirubin, Total 03/12/2024 0.5  0.0 - 1.2 mg/dL Final    ALT 03/12/2024 15  7 - 45 U/L Final    Patients treated with Sulfasalazine may generate falsely decreased results for ALT.    Hemoglobin A1C 03/12/2024 5.9 (H)  see below % Final    Estimated Average Glucose 03/12/2024 123  Not Established mg/dL Final    Cholesterol 03/12/2024 238 (H)  0 - 199 mg/dL Final          Age      Desirable   Borderline High   High     0-19 Y     0 - 169       170 - 199     >/= 200    20-24 Y     0 - 189       190 - 224     >/= 225         >24 Y     0 - 199       200 - 239     >/= 240   **All ranges are based on fasting samples. Specific   therapeutic targets will vary based on patient-specific   cardiac risk.    Pediatric guidelines reference:Pediatrics 2011, 128(S5).Adult guidelines reference: NCEP ATPIII Guidelines,JOSE 2001, 258:2486-97    Venipuncture immediately after or during the administration of Metamizole may lead to falsely low results. Testing should be performed immediately prior to Metamizole dosing.    HDL-Cholesterol 03/12/2024 43.0  mg/dL Final      Age       Very Low   Low     Normal    High    0-19 Y    < 35      < 40     40-45     ----  20-24 Y    ----     < 40      >45      ----        >24 Y      ----     < 40     40-60      >60      Cholesterol/HDL Ratio 03/12/2024 5.5   Final      Ref Values  Desirable  < 3.4  High Risk  > 5.0    LDL Calculated 03/12/2024 160 (H)  <=99 mg/dL Final                                Near   Borderline      AGE      Desirable  Optimal    High     High     Very High     0-19 Y     0 - 109     ---    110-129   >/= 130      ----    20-24 Y     0 - 119     ---    120-159   >/= 160     ----      >24 Y     0 -  99   100-129  130-159   160-189     >/=190      VLDL 03/12/2024 35  0 - 40 mg/dL Final    Triglycerides 03/12/2024 177 (H)  0 - 149 mg/dL Final       Age         Desirable   Borderline High   High     Very High   0 D-90 D    19 - 174         ----         ----        ----  91 D- 9 Y     0 -  74        75 -  99     >/= 100      ----    10-19 Y     0 -  89        90 - 129     >/= 130      ----    20-24 Y     0 - 114       115 - 149     >/= 150      ----         >24 Y     0 - 149       150 - 199    200- 499    >/= 500    Venipuncture immediately after or during the administration of Metamizole may lead to falsely low results. Testing should be performed immediately prior to Metamizole dosing.    Non HDL Cholesterol 03/12/2024 195 (H)  0 - 149 mg/dL Final          Age       Desirable   Borderline High   High     Very High     0-19 Y     0 - 119       120 - 144     >/= 145    >/= 160    20-24 Y     0 - 149       150 - 189     >/= 190      ----         >24 Y    30 mg/dL above LDL Cholesterol goal      Thyroid Stimulating Hormone 03/12/2024 4.20 (H)  0.44 - 3.98 mIU/L Final    Thyroxine, Free 03/12/2024 0.90  0.61 - 1.12 ng/dL Final   Office Visit on 03/01/2024   Component Date Value Ref Range Status    POC Color, Urine 03/01/2024 Yellow  Straw, Yellow, Light-Yellow Final    POC Appearance, Urine 03/01/2024 Cloudy (A)  Clear Final    POC Glucose, Urine 03/01/2024 NEGATIVE  NEGATIVE mg/dl Final    POC Bilirubin, Urine 03/01/2024 NEGATIVE  NEGATIVE Final    POC Ketones, Urine 03/01/2024 NEGATIVE  NEGATIVE mg/dl Final    POC Specific Gravity, Urine 03/01/2024 1.015  1.005 - 1.035 Final    POC Blood, Urine 03/01/2024 NEGATIVE  NEGATIVE Final    POC PH, Urine 03/01/2024 5.5  No Reference Range Established PH Final    POC Protein, Urine 03/01/2024 NEGATIVE  NEGATIVE, 30 (1+) mg/dl Final    POC Urobilinogen, Urine 03/01/2024 0.2  0.2, 1.0  EU/DL Final    Poc Nitrite, Urine 03/01/2024 POSITIVE (A)  NEGATIVE Final    POC Leukocytes, Urine 03/01/2024 MODERATE (2+) (A)  NEGATIVE Final    Urine Culture 03/01/2024 Multiple organisms present, probable contamination. Repeat culture if clinically indicated. (A)   Final        Assessment/Plan     Problem List Items Addressed This Visit       Chronic obstructive pulmonary disease (CMS/[facility])    Relevant Medications    Xopenex HFA 45 mcg/actuation inhaler    albuterol 2.5 mg /3 mL (0.083 %) nebulizer solution    ipratropium-albuteroL (Duo-Neb) 0.5-2.5 mg/3 mL nebulizer solution    Essential hypertension    History of TIA (transient ischemic attack)    Hyperlipidemia    Mild intermittent asthma    Relevant Medications    Xopenex HFA 45 mcg/actuation inhaler    Osteopenia    Acquired hypothyroidism    Prediabetes    Stage 3a chronic kidney disease (CMS/[facility]) - Primary     Other Visit Diagnoses       Acute bilateral low back pain without sciatica        Relevant Orders    Disability Placard    POCT UA Automated manually resulted (Completed)    Urine Culture    Frequent urination        Relevant Orders    Urine Culture          No major dementia noted  Cognitive impairment of aging noted    Follow up at next scheduled visit 6mo  Labs in 2mo for cmp  See me 6mo  Sooner if issues  Will order xopenex and albuterol for difft times  Uses wixela  This medications risks, benefits, and alternatives were discussed with patient at length.  If any unwanted side effects occur-discontinue medicine and call the office for discussion.  Tramadol helped back pain only took one whole pill    Overuse and abuse potential discussed with patient (parents if applicable)  If mood deterioration, status change etc on medicine, suicidal or homicidal ideations -patient agrees to proceed with crisis plan-in place-including-not limited to contacting family, our office and or proceeding to ER.  Reviewed controlled substance agreement - including but  not limited to the risks-benefits-alternatives to treatment with a controlled substance medication(s).  It is recommended that [place] reports be checked and patient have appointment at least every 3months(6 for certain medicines only)  If the agreement signed (controlled substance agreement) is violated prescriptions may be stopped abruptly and patient /family understands and agrees to this.  Another reason for termination of agreement is if we have concern for abuse or overuse and it is also recommended that patient take responsibility to try to taper and minimize use of these medicines frequently trying to limit or gradually taper to discontinuation.    Patient is aware that side effects such as insomnia, unexpected weight changes are unexpected and should result in discontinuation.  Always use caution and AVOID operating machinery(driving etc) on pain medicines or CNS depressants and avoid combining together OR with alcohol. If opioids are prescribed patient understands the benefits of narcan and was offered prescription.  Follow up sooner if condition deteriorates or problems arise.    Agrees to regular follow and counseling for maximum benefits.

## 2024-03-19 NOTE — PATIENT INSTRUCTIONS
RSV, Tetanus (Tdap) and Shingles vaccines are recommended. Medicare prefers you get these done at a pharmacy.

## 2024-04-11 NOTE — TELEPHONE ENCOUNTER
Patient's daughter Marika phones the office today w/ request to refill the patient's Meloxicam 7.5mg 1 TAB prn to be sent to [facility] [address] [place].     Thank you.

## 2024-05-07 ENCOUNTER — TELEPHONE (OUTPATIENT)
Dept: PRIMARY CARE | Facility: CLINIC | Age: 89
End: 2024-05-07
Payer: MEDICARE

## 2024-05-07 DIAGNOSIS — M19.90 GENERALIZED ARTHRITIS: ICD-10-CM

## 2024-05-07 DIAGNOSIS — J44.9 CHRONIC OBSTRUCTIVE PULMONARY DISEASE, UNSPECIFIED COPD TYPE (MULTI): ICD-10-CM

## 2024-05-07 NOTE — TELEPHONE ENCOUNTER
Patient's daughter phones the office today w/ request to renew the patient's Disability Placard.     Patient's daughter will  once ready and she can be reached at [phone] for any questions/concerns.     Thank you.

## 2024-06-11 ENCOUNTER — TELEPHONE (OUTPATIENT)
Dept: PRIMARY CARE | Facility: CLINIC | Age: 89
End: 2024-06-11
Payer: MEDICARE

## 2024-06-11 NOTE — TELEPHONE ENCOUNTER
Phoned [facility]  home informing Dr. Calderon not available to sign until 2024.

## 2024-06-11 NOTE — TELEPHONE ENCOUNTER
[facility] [place] called and wanted an update in regards to the patients death certificate, please advise.  home was also advised that [facility] is currently out of office.

## 2024-10-01 ENCOUNTER — APPOINTMENT (OUTPATIENT)
Dept: PRIMARY CARE | Facility: CLINIC | Age: 89
End: 2024-10-01
Payer: MEDICARE